# Patient Record
Sex: FEMALE | Race: BLACK OR AFRICAN AMERICAN | NOT HISPANIC OR LATINO | Employment: FULL TIME | URBAN - METROPOLITAN AREA
[De-identification: names, ages, dates, MRNs, and addresses within clinical notes are randomized per-mention and may not be internally consistent; named-entity substitution may affect disease eponyms.]

---

## 2021-10-28 ENCOUNTER — PATIENT ROUNDING (BHMG ONLY) (OUTPATIENT)
Dept: INTERNAL MEDICINE | Facility: CLINIC | Age: 31
End: 2021-10-28

## 2022-07-14 ENCOUNTER — TELEPHONE (OUTPATIENT)
Dept: URGENT CARE | Facility: CLINIC | Age: 32
End: 2022-07-14

## 2023-09-20 ENCOUNTER — HOSPITAL ENCOUNTER (INPATIENT)
Facility: HOSPITAL | Age: 33
LOS: 2 days | Discharge: HOME | DRG: 638 | End: 2023-09-22
Attending: EMERGENCY MEDICINE | Admitting: FAMILY MEDICINE
Payer: COMMERCIAL

## 2023-09-20 DIAGNOSIS — Z78.9 DECREASED ACTIVITIES OF DAILY LIVING (ADL): ICD-10-CM

## 2023-09-20 DIAGNOSIS — E11.65 SEVERE HYPERGLYCEMIA DUE TO DIABETES MELLITUS: Primary | ICD-10-CM

## 2023-09-20 DIAGNOSIS — E11.9 NEW ONSET TYPE 2 DIABETES MELLITUS: ICD-10-CM

## 2023-09-20 DIAGNOSIS — R26.2 DIFFICULTY WALKING: ICD-10-CM

## 2023-09-20 PROBLEM — E11.10 DKA (DIABETIC KETOACIDOSIS): Status: ACTIVE | Noted: 2023-09-20

## 2023-09-20 LAB
ACETONE BLD QL: NEGATIVE
ALBUMIN SERPL-MCNC: 3.5 G/DL (ref 3.5–5.2)
ALBUMIN/GLOB SERPL: 0.8 G/DL
ALP SERPL-CCNC: 187 U/L (ref 39–117)
ALT SERPL W P-5'-P-CCNC: 43 U/L (ref 1–33)
AMPHET+METHAMPHET UR QL: NEGATIVE
ANION GAP SERPL CALCULATED.3IONS-SCNC: 11.6 MMOL/L (ref 5–15)
ANION GAP SERPL CALCULATED.3IONS-SCNC: 14.4 MMOL/L (ref 5–15)
ANION GAP SERPL CALCULATED.3IONS-SCNC: 8.6 MMOL/L (ref 5–15)
AST SERPL-CCNC: 48 U/L (ref 1–32)
BACTERIA UR QL AUTO: ABNORMAL /HPF
BARBITURATES UR QL SCN: NEGATIVE
BASOPHILS # BLD AUTO: 0.03 10*3/MM3 (ref 0–0.2)
BASOPHILS NFR BLD AUTO: 0.4 % (ref 0–1.5)
BENZODIAZ UR QL SCN: NEGATIVE
BILIRUB SERPL-MCNC: 0.3 MG/DL (ref 0–1.2)
BILIRUB UR QL STRIP: NEGATIVE
BUN SERPL-MCNC: 12 MG/DL (ref 6–20)
BUN SERPL-MCNC: 15 MG/DL (ref 6–20)
BUN SERPL-MCNC: 15 MG/DL (ref 6–20)
BUN/CREAT SERPL: 15.2 (ref 7–25)
BUN/CREAT SERPL: 18.2 (ref 7–25)
BUN/CREAT SERPL: 20.3 (ref 7–25)
CALCIUM SPEC-SCNC: 8.6 MG/DL (ref 8.6–10.5)
CALCIUM SPEC-SCNC: 8.8 MG/DL (ref 8.6–10.5)
CALCIUM SPEC-SCNC: 9.3 MG/DL (ref 8.6–10.5)
CANNABINOIDS SERPL QL: NEGATIVE
CHLORIDE SERPL-SCNC: 100 MMOL/L (ref 98–107)
CHLORIDE SERPL-SCNC: 83 MMOL/L (ref 98–107)
CHLORIDE SERPL-SCNC: 97 MMOL/L (ref 98–107)
CLARITY UR: CLEAR
CO2 SERPL-SCNC: 23.4 MMOL/L (ref 22–29)
CO2 SERPL-SCNC: 25.6 MMOL/L (ref 22–29)
CO2 SERPL-SCNC: 26.4 MMOL/L (ref 22–29)
COCAINE UR QL: POSITIVE
COLOR UR: YELLOW
CREAT SERPL-MCNC: 0.66 MG/DL (ref 0.57–1)
CREAT SERPL-MCNC: 0.74 MG/DL (ref 0.57–1)
CREAT SERPL-MCNC: 0.99 MG/DL (ref 0.57–1)
DEPRECATED RDW RBC AUTO: 42 FL (ref 37–54)
EGFRCR SERPLBLD CKD-EPI 2021: 109 ML/MIN/1.73
EGFRCR SERPLBLD CKD-EPI 2021: 118.2 ML/MIN/1.73
EGFRCR SERPLBLD CKD-EPI 2021: 76.9 ML/MIN/1.73
EOSINOPHIL # BLD AUTO: 0.05 10*3/MM3 (ref 0–0.4)
EOSINOPHIL NFR BLD AUTO: 0.7 % (ref 0.3–6.2)
ERYTHROCYTE [DISTWIDTH] IN BLOOD BY AUTOMATED COUNT: 14.6 % (ref 12.3–15.4)
FENTANYL UR-MCNC: NEGATIVE NG/ML
FERRITIN SERPL-MCNC: 119.8 NG/ML (ref 13–150)
FOLATE SERPL-MCNC: 19.8 NG/ML (ref 4.78–24.2)
GLOBULIN UR ELPH-MCNC: 4.2 GM/DL
GLUCOSE BLDC GLUCOMTR-MCNC: 106 MG/DL (ref 70–99)
GLUCOSE BLDC GLUCOMTR-MCNC: 130 MG/DL (ref 70–99)
GLUCOSE BLDC GLUCOMTR-MCNC: 145 MG/DL (ref 70–99)
GLUCOSE BLDC GLUCOMTR-MCNC: 154 MG/DL (ref 70–99)
GLUCOSE BLDC GLUCOMTR-MCNC: 306 MG/DL (ref 70–99)
GLUCOSE BLDC GLUCOMTR-MCNC: 313 MG/DL (ref 70–99)
GLUCOSE BLDC GLUCOMTR-MCNC: 348 MG/DL (ref 70–99)
GLUCOSE BLDC GLUCOMTR-MCNC: 385 MG/DL (ref 70–99)
GLUCOSE BLDC GLUCOMTR-MCNC: 433 MG/DL (ref 70–99)
GLUCOSE BLDC GLUCOMTR-MCNC: 459 MG/DL (ref 70–99)
GLUCOSE BLDC GLUCOMTR-MCNC: 558 MG/DL (ref 70–99)
GLUCOSE BLDC GLUCOMTR-MCNC: 90 MG/DL (ref 70–99)
GLUCOSE BLDC GLUCOMTR-MCNC: >600 MG/DL (ref 70–99)
GLUCOSE BLDC GLUCOMTR-MCNC: >600 MG/DL (ref 70–99)
GLUCOSE SERPL-MCNC: 235 MG/DL (ref 65–99)
GLUCOSE SERPL-MCNC: 458 MG/DL (ref 65–99)
GLUCOSE SERPL-MCNC: 850 MG/DL (ref 65–99)
GLUCOSE UR STRIP-MCNC: ABNORMAL MG/DL
HBA1C MFR BLD: 9.2 % (ref 4.8–5.6)
HCT VFR BLD AUTO: 36.3 % (ref 34–46.6)
HGB BLD-MCNC: 11.8 G/DL (ref 12–15.9)
HGB UR QL STRIP.AUTO: ABNORMAL
HOLD SPECIMEN: NORMAL
HOLD SPECIMEN: NORMAL
HYALINE CASTS UR QL AUTO: ABNORMAL /LPF
IMM GRANULOCYTES # BLD AUTO: 0.01 10*3/MM3 (ref 0–0.05)
IMM GRANULOCYTES NFR BLD AUTO: 0.1 % (ref 0–0.5)
IRON 24H UR-MRATE: 47 MCG/DL (ref 37–145)
IRON SATN MFR SERPL: 10 % (ref 20–50)
KETONES UR QL STRIP: NEGATIVE
LEUKOCYTE ESTERASE UR QL STRIP.AUTO: NEGATIVE
LYMPHOCYTES # BLD AUTO: 2.05 10*3/MM3 (ref 0.7–3.1)
LYMPHOCYTES NFR BLD AUTO: 27.5 % (ref 19.6–45.3)
MAGNESIUM SERPL-MCNC: 1.7 MG/DL (ref 1.6–2.6)
MAGNESIUM SERPL-MCNC: 1.7 MG/DL (ref 1.6–2.6)
MAGNESIUM SERPL-MCNC: 1.8 MG/DL (ref 1.6–2.6)
MCH RBC QN AUTO: 26 PG (ref 26.6–33)
MCHC RBC AUTO-ENTMCNC: 32.5 G/DL (ref 31.5–35.7)
MCV RBC AUTO: 80 FL (ref 79–97)
METHADONE UR QL SCN: NEGATIVE
MONOCYTES # BLD AUTO: 0.55 10*3/MM3 (ref 0.1–0.9)
MONOCYTES NFR BLD AUTO: 7.4 % (ref 5–12)
NEUTROPHILS NFR BLD AUTO: 4.76 10*3/MM3 (ref 1.7–7)
NEUTROPHILS NFR BLD AUTO: 63.9 % (ref 42.7–76)
NITRITE UR QL STRIP: NEGATIVE
NRBC BLD AUTO-RTO: 0 /100 WBC (ref 0–0.2)
OPIATES UR QL: NEGATIVE
OSMOLALITY SERPL: 318 MOSM/KG (ref 275–300)
OXYCODONE UR QL SCN: NEGATIVE
PH UR STRIP.AUTO: 5.5 [PH] (ref 5–8)
PHOSPHATE SERPL-MCNC: 2.8 MG/DL (ref 2.5–4.5)
PHOSPHATE SERPL-MCNC: 3.5 MG/DL (ref 2.5–4.5)
PHOSPHATE SERPL-MCNC: 4.9 MG/DL (ref 2.5–4.5)
PLATELET # BLD AUTO: 206 10*3/MM3 (ref 140–450)
PMV BLD AUTO: 12.2 FL (ref 6–12)
POTASSIUM SERPL-SCNC: 3.4 MMOL/L (ref 3.5–5.2)
POTASSIUM SERPL-SCNC: 4.1 MMOL/L (ref 3.5–5.2)
POTASSIUM SERPL-SCNC: 4.8 MMOL/L (ref 3.5–5.2)
PROT SERPL-MCNC: 7.7 G/DL (ref 6–8.5)
PROT UR QL STRIP: NEGATIVE
RBC # BLD AUTO: 4.54 10*6/MM3 (ref 3.77–5.28)
RBC # UR STRIP: ABNORMAL /HPF
REF LAB TEST METHOD: ABNORMAL
RETICS # AUTO: 0.07 10*6/MM3 (ref 0.02–0.13)
RETICS/RBC NFR AUTO: 1.61 % (ref 0.7–1.9)
SODIUM SERPL-SCNC: 123 MMOL/L (ref 136–145)
SODIUM SERPL-SCNC: 132 MMOL/L (ref 136–145)
SODIUM SERPL-SCNC: 135 MMOL/L (ref 136–145)
SP GR UR STRIP: >1.03 (ref 1–1.03)
SQUAMOUS #/AREA URNS HPF: ABNORMAL /HPF
TIBC SERPL-MCNC: 468 MCG/DL (ref 298–536)
TRANSFERRIN SERPL-MCNC: 314 MG/DL (ref 200–360)
UROBILINOGEN UR QL STRIP: ABNORMAL
VIT B12 BLD-MCNC: 1041 PG/ML (ref 211–946)
WBC # UR STRIP: ABNORMAL /HPF
WBC NRBC COR # BLD: 7.45 10*3/MM3 (ref 3.4–10.8)
WHOLE BLOOD HOLD COAG: NORMAL
WHOLE BLOOD HOLD SPECIMEN: NORMAL

## 2023-09-20 PROCEDURE — 80307 DRUG TEST PRSMV CHEM ANLYZR: CPT | Performed by: EMERGENCY MEDICINE

## 2023-09-20 PROCEDURE — 82746 ASSAY OF FOLIC ACID SERUM: CPT | Performed by: HOSPITALIST

## 2023-09-20 PROCEDURE — 82607 VITAMIN B-12: CPT | Performed by: HOSPITALIST

## 2023-09-20 PROCEDURE — 0 POTASSIUM CHLORIDE PER 2 MEQ: Performed by: EMERGENCY MEDICINE

## 2023-09-20 PROCEDURE — 83036 HEMOGLOBIN GLYCOSYLATED A1C: CPT | Performed by: EMERGENCY MEDICINE

## 2023-09-20 PROCEDURE — 81001 URINALYSIS AUTO W/SCOPE: CPT | Performed by: EMERGENCY MEDICINE

## 2023-09-20 PROCEDURE — 82948 REAGENT STRIP/BLOOD GLUCOSE: CPT

## 2023-09-20 PROCEDURE — 99291 CRITICAL CARE FIRST HOUR: CPT | Performed by: STUDENT IN AN ORGANIZED HEALTH CARE EDUCATION/TRAINING PROGRAM

## 2023-09-20 PROCEDURE — 83540 ASSAY OF IRON: CPT | Performed by: HOSPITALIST

## 2023-09-20 PROCEDURE — 36415 COLL VENOUS BLD VENIPUNCTURE: CPT | Performed by: EMERGENCY MEDICINE

## 2023-09-20 PROCEDURE — 99285 EMERGENCY DEPT VISIT HI MDM: CPT

## 2023-09-20 PROCEDURE — 85025 COMPLETE CBC W/AUTO DIFF WBC: CPT | Performed by: EMERGENCY MEDICINE

## 2023-09-20 PROCEDURE — 83930 ASSAY OF BLOOD OSMOLALITY: CPT | Performed by: EMERGENCY MEDICINE

## 2023-09-20 PROCEDURE — 82009 KETONE BODYS QUAL: CPT | Performed by: EMERGENCY MEDICINE

## 2023-09-20 PROCEDURE — 85045 AUTOMATED RETICULOCYTE COUNT: CPT | Performed by: HOSPITALIST

## 2023-09-20 PROCEDURE — 82728 ASSAY OF FERRITIN: CPT | Performed by: HOSPITALIST

## 2023-09-20 PROCEDURE — 84100 ASSAY OF PHOSPHORUS: CPT | Performed by: EMERGENCY MEDICINE

## 2023-09-20 PROCEDURE — 84466 ASSAY OF TRANSFERRIN: CPT | Performed by: HOSPITALIST

## 2023-09-20 PROCEDURE — 25010000002 ENOXAPARIN PER 10 MG: Performed by: HOSPITALIST

## 2023-09-20 PROCEDURE — 80053 COMPREHEN METABOLIC PANEL: CPT | Performed by: EMERGENCY MEDICINE

## 2023-09-20 PROCEDURE — 83735 ASSAY OF MAGNESIUM: CPT | Performed by: EMERGENCY MEDICINE

## 2023-09-20 PROCEDURE — 36415 COLL VENOUS BLD VENIPUNCTURE: CPT

## 2023-09-20 PROCEDURE — 63710000001 INSULIN REGULAR HUMAN PER 5 UNITS: Performed by: EMERGENCY MEDICINE

## 2023-09-20 RX ORDER — POTASSIUM CHLORIDE 20 MEQ/1
40 TABLET, EXTENDED RELEASE ORAL EVERY 4 HOURS
Status: COMPLETED | OUTPATIENT
Start: 2023-09-20 | End: 2023-09-21

## 2023-09-20 RX ORDER — VILAZODONE HYDROCHLORIDE 20 MG/1
40 TABLET ORAL DAILY
Status: DISCONTINUED | OUTPATIENT
Start: 2023-09-20 | End: 2023-09-22 | Stop reason: HOSPADM

## 2023-09-20 RX ORDER — DEXTROSE AND SODIUM CHLORIDE 5; .9 G/100ML; G/100ML
150 INJECTION, SOLUTION INTRAVENOUS CONTINUOUS PRN
Status: DISCONTINUED | OUTPATIENT
Start: 2023-09-20 | End: 2023-09-21

## 2023-09-20 RX ORDER — BUPRENORPHINE AND NALOXONE 8; 2 MG/1; MG/1
2 FILM, SOLUBLE BUCCAL; SUBLINGUAL DAILY
COMMUNITY
Start: 2023-09-06

## 2023-09-20 RX ORDER — SODIUM CHLORIDE AND POTASSIUM CHLORIDE 150; 450 MG/100ML; MG/100ML
250 INJECTION, SOLUTION INTRAVENOUS CONTINUOUS PRN
Status: DISCONTINUED | OUTPATIENT
Start: 2023-09-20 | End: 2023-09-21

## 2023-09-20 RX ORDER — BISACODYL 10 MG
10 SUPPOSITORY, RECTAL RECTAL DAILY PRN
Status: DISCONTINUED | OUTPATIENT
Start: 2023-09-20 | End: 2023-09-22 | Stop reason: HOSPADM

## 2023-09-20 RX ORDER — SODIUM CHLORIDE 9 MG/ML
250 INJECTION, SOLUTION INTRAVENOUS CONTINUOUS PRN
Status: DISCONTINUED | OUTPATIENT
Start: 2023-09-20 | End: 2023-09-22 | Stop reason: HOSPADM

## 2023-09-20 RX ORDER — DEXTROAMPHETAMINE SACCHARATE, AMPHETAMINE ASPARTATE, DEXTROAMPHETAMINE SULFATE AND AMPHETAMINE SULFATE 7.5; 7.5; 7.5; 7.5 MG/1; MG/1; MG/1; MG/1
30 TABLET ORAL 2 TIMES DAILY
COMMUNITY
Start: 2023-09-11

## 2023-09-20 RX ORDER — DEXTROSE MONOHYDRATE, SODIUM CHLORIDE, AND POTASSIUM CHLORIDE 50; 1.49; 9 G/1000ML; G/1000ML; G/1000ML
150 INJECTION, SOLUTION INTRAVENOUS CONTINUOUS PRN
Status: DISCONTINUED | OUTPATIENT
Start: 2023-09-20 | End: 2023-09-21

## 2023-09-20 RX ORDER — IBUPROFEN 600 MG/1
1 TABLET ORAL
Status: DISCONTINUED | OUTPATIENT
Start: 2023-09-20 | End: 2023-09-21

## 2023-09-20 RX ORDER — AMOXICILLIN 250 MG
2 CAPSULE ORAL 2 TIMES DAILY
Status: DISCONTINUED | OUTPATIENT
Start: 2023-09-20 | End: 2023-09-22 | Stop reason: HOSPADM

## 2023-09-20 RX ORDER — SODIUM CHLORIDE 0.9 % (FLUSH) 0.9 %
10 SYRINGE (ML) INJECTION AS NEEDED
Status: DISCONTINUED | OUTPATIENT
Start: 2023-09-20 | End: 2023-09-22 | Stop reason: HOSPADM

## 2023-09-20 RX ORDER — ENOXAPARIN SODIUM 100 MG/ML
40 INJECTION SUBCUTANEOUS DAILY
Status: DISCONTINUED | OUTPATIENT
Start: 2023-09-20 | End: 2023-09-22 | Stop reason: HOSPADM

## 2023-09-20 RX ORDER — SODIUM CHLORIDE AND POTASSIUM CHLORIDE 150; 900 MG/100ML; MG/100ML
250 INJECTION, SOLUTION INTRAVENOUS CONTINUOUS PRN
Status: DISCONTINUED | OUTPATIENT
Start: 2023-09-20 | End: 2023-09-21

## 2023-09-20 RX ORDER — CHOLECALCIFEROL (VITAMIN D3) 125 MCG
5 CAPSULE ORAL NIGHTLY PRN
Status: DISCONTINUED | OUTPATIENT
Start: 2023-09-20 | End: 2023-09-22 | Stop reason: HOSPADM

## 2023-09-20 RX ORDER — DEXTROSE MONOHYDRATE 25 G/50ML
10-50 INJECTION, SOLUTION INTRAVENOUS
Status: DISCONTINUED | OUTPATIENT
Start: 2023-09-20 | End: 2023-09-22 | Stop reason: HOSPADM

## 2023-09-20 RX ORDER — ONDANSETRON 4 MG/1
4 TABLET, FILM COATED ORAL EVERY 6 HOURS PRN
Status: DISCONTINUED | OUTPATIENT
Start: 2023-09-20 | End: 2023-09-22 | Stop reason: HOSPADM

## 2023-09-20 RX ORDER — ACETAMINOPHEN 500 MG
1000 TABLET ORAL 3 TIMES DAILY
Status: COMPLETED | OUTPATIENT
Start: 2023-09-20 | End: 2023-09-22

## 2023-09-20 RX ORDER — BUPRENORPHINE HYDROCHLORIDE AND NALOXONE HYDROCHLORIDE DIHYDRATE 8; 2 MG/1; MG/1
1 TABLET SUBLINGUAL DAILY
Status: CANCELLED | OUTPATIENT
Start: 2023-09-20

## 2023-09-20 RX ORDER — DEXTROSE MONOHYDRATE, SODIUM CHLORIDE, AND POTASSIUM CHLORIDE 50; 1.49; 4.5 G/1000ML; G/1000ML; G/1000ML
150 INJECTION, SOLUTION INTRAVENOUS CONTINUOUS PRN
Status: DISCONTINUED | OUTPATIENT
Start: 2023-09-20 | End: 2023-09-21

## 2023-09-20 RX ORDER — ARIPIPRAZOLE 10 MG/1
10 TABLET ORAL DAILY
Status: DISCONTINUED | OUTPATIENT
Start: 2023-09-20 | End: 2023-09-22 | Stop reason: HOSPADM

## 2023-09-20 RX ORDER — SODIUM CHLORIDE 0.9 % (FLUSH) 0.9 %
10 SYRINGE (ML) INJECTION EVERY 12 HOURS SCHEDULED
Status: DISCONTINUED | OUTPATIENT
Start: 2023-09-20 | End: 2023-09-22 | Stop reason: HOSPADM

## 2023-09-20 RX ORDER — SODIUM CHLORIDE AND POTASSIUM CHLORIDE 300; 900 MG/100ML; MG/100ML
250 INJECTION, SOLUTION INTRAVENOUS CONTINUOUS PRN
Status: DISCONTINUED | OUTPATIENT
Start: 2023-09-20 | End: 2023-09-21

## 2023-09-20 RX ORDER — TERAZOSIN 1 MG/1
1 CAPSULE ORAL NIGHTLY
Status: DISCONTINUED | OUTPATIENT
Start: 2023-09-20 | End: 2023-09-22 | Stop reason: HOSPADM

## 2023-09-20 RX ORDER — DEXTROSE MONOHYDRATE, SODIUM CHLORIDE, AND POTASSIUM CHLORIDE 50; 2.98; 4.5 G/1000ML; G/1000ML; G/1000ML
150 INJECTION, SOLUTION INTRAVENOUS CONTINUOUS PRN
Status: DISCONTINUED | OUTPATIENT
Start: 2023-09-20 | End: 2023-09-21

## 2023-09-20 RX ORDER — VILAZODONE HYDROCHLORIDE 40 MG/1
1 TABLET ORAL DAILY
COMMUNITY
Start: 2023-08-31

## 2023-09-20 RX ORDER — SODIUM CHLORIDE 9 MG/ML
40 INJECTION, SOLUTION INTRAVENOUS AS NEEDED
Status: DISCONTINUED | OUTPATIENT
Start: 2023-09-20 | End: 2023-09-22 | Stop reason: HOSPADM

## 2023-09-20 RX ORDER — ONDANSETRON 2 MG/ML
4 INJECTION INTRAMUSCULAR; INTRAVENOUS EVERY 6 HOURS PRN
Status: DISCONTINUED | OUTPATIENT
Start: 2023-09-20 | End: 2023-09-22 | Stop reason: HOSPADM

## 2023-09-20 RX ORDER — BISACODYL 5 MG/1
5 TABLET, DELAYED RELEASE ORAL DAILY PRN
Status: DISCONTINUED | OUTPATIENT
Start: 2023-09-20 | End: 2023-09-22 | Stop reason: HOSPADM

## 2023-09-20 RX ORDER — SODIUM CHLORIDE 450 MG/100ML
250 INJECTION, SOLUTION INTRAVENOUS CONTINUOUS PRN
Status: DISCONTINUED | OUTPATIENT
Start: 2023-09-20 | End: 2023-09-21

## 2023-09-20 RX ORDER — DEXTROSE MONOHYDRATE, SODIUM CHLORIDE, AND POTASSIUM CHLORIDE 50; 2.98; 9 G/1000ML; G/1000ML; G/1000ML
150 INJECTION, SOLUTION INTRAVENOUS CONTINUOUS PRN
Status: DISCONTINUED | OUTPATIENT
Start: 2023-09-20 | End: 2023-09-21

## 2023-09-20 RX ORDER — NICOTINE POLACRILEX 4 MG
15 LOZENGE BUCCAL
Status: DISCONTINUED | OUTPATIENT
Start: 2023-09-20 | End: 2023-09-21

## 2023-09-20 RX ORDER — DEXTROSE AND SODIUM CHLORIDE 5; .45 G/100ML; G/100ML
150 INJECTION, SOLUTION INTRAVENOUS CONTINUOUS PRN
Status: DISCONTINUED | OUTPATIENT
Start: 2023-09-20 | End: 2023-09-21

## 2023-09-20 RX ORDER — POLYETHYLENE GLYCOL 3350 17 G/17G
17 POWDER, FOR SOLUTION ORAL DAILY PRN
Status: DISCONTINUED | OUTPATIENT
Start: 2023-09-20 | End: 2023-09-22 | Stop reason: HOSPADM

## 2023-09-20 RX ADMIN — ACETAMINOPHEN 1000 MG: 500 TABLET ORAL at 16:18

## 2023-09-20 RX ADMIN — Medication 10 ML: at 13:46

## 2023-09-20 RX ADMIN — ARIPIPRAZOLE 10 MG: 10 TABLET ORAL at 16:18

## 2023-09-20 RX ADMIN — INSULIN HUMAN 10 UNITS: 100 INJECTION, SOLUTION PARENTERAL at 11:30

## 2023-09-20 RX ADMIN — SODIUM CHLORIDE 1000 ML: 9 INJECTION, SOLUTION INTRAVENOUS at 11:06

## 2023-09-20 RX ADMIN — SODIUM CHLORIDE 1000 ML/HR: 9 INJECTION, SOLUTION INTRAVENOUS at 13:45

## 2023-09-20 RX ADMIN — INSULIN HUMAN 8 UNITS/HR: 1 INJECTION, SOLUTION INTRAVENOUS at 13:50

## 2023-09-20 RX ADMIN — TERAZOSIN HYDROCHLORIDE 1 MG: 1 CAPSULE ORAL at 20:46

## 2023-09-20 RX ADMIN — POTASSIUM CHLORIDE 40 MEQ: 1500 TABLET, EXTENDED RELEASE ORAL at 20:45

## 2023-09-20 RX ADMIN — Medication 10 ML: at 20:38

## 2023-09-20 RX ADMIN — VILAZODONE HYDROCHLORIDE 40 MG: 20 TABLET ORAL at 16:17

## 2023-09-20 RX ADMIN — POTASSIUM CHLORIDE AND SODIUM CHLORIDE 250 ML/HR: 450; 150 INJECTION, SOLUTION INTRAVENOUS at 16:18

## 2023-09-20 RX ADMIN — ACETAMINOPHEN 1000 MG: 500 TABLET ORAL at 20:38

## 2023-09-20 RX ADMIN — ENOXAPARIN SODIUM 40 MG: 100 INJECTION SUBCUTANEOUS at 16:18

## 2023-09-20 RX ADMIN — POTASSIUM CHLORIDE, DEXTROSE MONOHYDRATE AND SODIUM CHLORIDE 150 ML/HR: 150; 5; 450 INJECTION, SOLUTION INTRAVENOUS at 20:31

## 2023-09-20 RX ADMIN — INSULIN HUMAN 5.7 UNITS/HR: 1 INJECTION, SOLUTION INTRAVENOUS at 20:34

## 2023-09-20 NOTE — PAYOR COMM NOTE
"PATIENT INFORMATION  Name:  Jose Chacko  MRN#:     [id number]  :  1990         ADMISSION INFORMATION  CLASS: Inpatient   DOS:  23        CURRENT ATTENDING PROVIDER INFORMATION  Name/NPI: Suyapa Riddle DO [[id number]]  Phone: Phone: [phone]        RENDERING FACILITY  Name:  [facility]   NPI:  [id number]  TID:  [id number]  Address:      [address] [place] [address]  Phone  [phone]        CASE MANAGEMENT CONTACT INFORMATION  Phone:      [phone]  Fax:           [phone]        ADMISSION DIAGNOSIS  DKA (diabetic ketoacidosis) [E11.10]  ++NO Previous Diagnosis of Diabetes++            Jose Chacko (33 y.o. Female)       Date of Birth   1990    Social Security Number       Address   [address] [place], [place] [address]    Home Phone   [phone]    MRN   [id number]       [employer]   None    Marital Status   Single                            Admission Date   23    Admission Type   Emergency    Admitting Provider   Suyapa Riddle DO    Attending Provider   Suyapa Riddle DO    Department, Room/Bed   [facility] EMERGENCY ROOM, BESS/BESS       Discharge Date       Discharge Disposition       Discharge Destination                                 Attending Provider: Suyapa Riddle DO    Allergies: No Known Allergies    Isolation: None   Infection: None   Code Status: CPR    Ht: 170.2 cm (67\")   Wt: 115 kg (253 lb 8.5 oz)    Admission Cmt: None   Principal Problem: DKA (diabetic ketoacidosis) [E11.10]                   Active Insurance as of 2023       Primary Coverage       Payor Plan Insurance Group Employer/Plan Group    [facility] BY ZURI [facility] BY ZURI [id number]       Payor Plan Address Payor Plan Phone Number Payor Plan Fax Number Effective Dates    [address] [address]   2021 - None Entered    [facility] [id number]         Subscriber Name Subscriber Birth Date Member ID       JOSE CHACKO 1990 " [id number]                        History & Physical        Suyapa Riddle DO at 23 1352           [facility]  Date[facility]   Patient Name: Jose Chacko  : 1990  MRN: [id number]  Primary Care Physician:  Provider, No Known  Date of admission: 2023    Subjective hyperglycemia  Subjective     Chief Complaint: Hyperglycemia    HPI: Patient is a 33-year-old female that presents to the emergency room with a 2-week history of increased fatigue, polyuria, polydipsia.  She uses her father's glucometer to measure her blood sugar and found it to be greater than 500.    On arrival to the ED, patient is a temperature of 98.1, pulse of 102, respiratory rate of 16, blood pressure 131/75, and she saturating 97% on room air.  Patient's glucose is 850.  Sodium is 123.  Chloride is 83.  ALT is 43, AST is 48, alkaline phosphatase is 187.    Patient's toxicology screen is positive for cocaine.  Hemoglobin is 11.8.  Personal History     Past Medical History:  Past Medical History:   Diagnosis Date    Anxiety     Depression     anxiety    PTSD (post-traumatic stress disorder)        Past Surgical History:  Past Surgical History:   Procedure Laterality Date     SECTION         Family History:   History reviewed. No pertinent family history.    Social History:   Social History     Socioeconomic History    Marital status: Single   Tobacco Use    Smoking status: Former     Packs/day: 0.50     Types: Cigarettes    Smokeless tobacco: Never   Vaping Use    Vaping Use: Every day    Substances: Nicotine, Flavoring   Substance and Sexual Activity    Alcohol use: Never    Drug use: Never    Sexual activity: Defer       Home Medications:  ARIPiprazole, amphetamine-dextroamphetamine, buprenorphine-naloxone, clonazePAM, diclofenac, gabapentin, prazosin, and vilazodone    Allergies:  No Known Allergies    Review of Systems   All systems were reviewed and negative except for:  fatigue, polyuria, polydipsia    Objective   Objective     Vitals:   Temp:  [98.1 °F (36.7 °C)] 98.1 °F (36.7 °C)  Heart Rate:  [102] 102  Resp:  [16] 16  BP: (131)/(75) 131/75    Physical Exam    Constitutional: Awake, alert, no acute distress   Eyes: Pupils equal, sclerae anicteric, no conjunctival injection   HENT: NCAT, mucous membranes moist   Neck: Supple, no thyromegaly, no lymphadenopathy, trachea midline   Respiratory: Clear to auscultation bilaterally, nonlabored respirations    Cardiovascular: RRR, no murmurs, rubs, or gallops, palpable pedal pulses bilaterally   Gastrointestinal: Positive bowel sounds, soft, nontender, nondistended   Musculoskeletal: No bilateral ankle edema, no clubbing or cyanosis to extremities   Psychiatric: Appropriate affect, cooperative   Neurologic: Oriented x 3, strength symmetric in all extremities, Cranial Nerves grossly intact to confrontation, speech clear   Skin: No rashes     Result Review    Result Review:  I have personally reviewed the results from the time of this admission to 9/20/2023 14:14 EDT and agree with these findings:  [x]  Laboratory  []  Microbiology  [x]  Radiology  []  EKG/Telemetry   []  Cardiology/Vascular   []  Pathology  [x]  Old records  []  Other:      Assessment & Plan   Assessment / Plan   #1 hyperosmolar hyperglycemia   -Insulin drip, aggressive hydration, correct electrolytes  -Follow-up hemoglobin A1c.  Diabetic educator.  -Patient will need admission to the ICU.    #2 anemia  -Work-up ordered    #3 cocaine use  -Patient is on Suboxone at home.  She has relapsed.  I will hold Suboxone.  -Case management/social work consulted    #4 depression and anxiety PTSD  -Continue Abilify, Viibryd.  Hospital does not have prazosin.  We will order Hytrin substitution.  -We will hold home Klonopin for now.  Patient is very fatigued.      #5 ADH  -Patient on Adderall at home.  We do not have Adderall at the hospital.  Patient will need to supply her with  medication.  -Patient's amphetamine screen is negative.    Patient is on multiple prescriptions that adversely interact with each other: Adderall, Klonopin, gabapentin, Suboxone.  Additionally, patient has used cocaine.  Will defer to the primary rounding team/pharmacy to help establish which medications are needed and which medications patient is actually taking.      DVT prophylaxis:  lovenox    CODE STATUS:    Level Of Support Discussed With: Patient  Code Status (Patient has no pulse and is not breathing): CPR (Attempt to Resuscitate)  Medical Interventions (Patient has pulse or is breathing): Full Support      Admission Status:  I believe this patient meets inpatient status.    Electronically signed by Suyapa Riddle DO, 09/20/23, 1:53 PM EDT.             Electronically signed by Suyapa Riddle DO at 09/20/23 1418       Vital Signs (last day)       Date/Time Temp Temp src Pulse Resp BP Patient Position SpO2    09/20/23 1043 98.1 (36.7) Oral -- -- -- -- --    09/20/23 1017 -- -- 102 16 131/75 Sitting 97          Facility-Administered Medications as of 9/20/2023   Medication Dose Route Frequency Provider Last Rate Last Admin    Calcium Replacement - Follow Nurse / BPA Driven Protocol   Does not apply PRDavid Mendez MD        dextrose (D50W) (25 g/50 mL) IV injection 10-50 mL  10-50 mL Intravenous Q15 Min David Colon MD        dextrose (GLUTOSE) oral gel 15 g  15 g Oral Q15 Min PRDavid Mendez MD        dextrose 5 % and sodium chloride 0.45 % infusion  150 mL/hr Intravenous Continuous David Colon MD        dextrose 5 % and sodium chloride 0.45 % with KCl 20 mEq/L infusion  150 mL/hr Intravenous Continuous David Colon MD        dextrose 5 % and sodium chloride 0.45 % with KCl 40 mEq/L infusion  150 mL/hr Intravenous Continuous PRDavid Mendez MD        dextrose 5 % and sodium chloride 0.9 % infusion  150 mL/hr Intravenous Continuous David Colon MD         dextrose 5 % and sodium chloride 0.9 % with KCl 20 mEq/L infusion  150 mL/hr Intravenous Continuous PRN David Ortega MD        dextrose 5 % and sodium chloride 0.9 % with KCl 40 mEq/L infusion  150 mL/hr Intravenous Continuous PRN David Ortega MD        Glucagon (GLUCAGEN) injection 1 mg  1 mg Intramuscular Q15 Min PRN David Ortega MD        [COMPLETED] insulin regular (humuLIN R,novoLIN R) injection 10 Units  10 Units Intravenous Once David Ortega MD   10 Units at 09/20/23 1130    insulin regular 1 unit/mL in 0.9% sodium chloride (Glucommander)  0-100 Units/hr Intravenous Titrated David Ortega MD 8 mL/hr at 09/20/23 1350 8 Units/hr at 09/20/23 1350    Magnesium Standard Dose Replacement - Follow Nurse / BPA Driven Protocol   Does not apply David Colon MD        Phosphorus Replacement - Follow Nurse / BPA Driven Protocol   Does not apply David Colon MD        Potassium Replacement - Follow Nurse / BPA Driven Protocol   Does not apply David Colon MD        sodium chloride 0.45 % 1,000 mL with potassium chloride 40 mEq infusion  250 mL/hr Intravenous Continuous PRN David Ortega MD        sodium chloride 0.45 % infusion  250 mL/hr Intravenous Continuous PRN David Ortega MD        sodium chloride 0.45 % with KCl 20 mEq/L infusion  250 mL/hr Intravenous Continuous PRDavid Mendez MD        [COMPLETED] sodium chloride 0.9 % bolus 1,000 mL  1,000 mL Intravenous Once David Ortega MD 2,000 mL/hr at 09/20/23 1106 1,000 mL at 09/20/23 1106    sodium chloride 0.9 % bolus 1,000 mL  1,000 mL Intravenous Once Suyapa Riddle DO        sodium chloride 0.9 % bolus  1,000 mL/hr Intravenous Continuous David Ortega MD 1,000 mL/hr at 09/20/23 1345 1,000 mL/hr at 09/20/23 1345    sodium chloride 0.9 % flush 10 mL  10 mL Intravenous PRN David Ortega MD        sodium chloride 0.9 % flush 10 mL  10 mL Intravenous Q12H David Ortega MD    10 mL at 09/20/23 1346    sodium chloride 0.9 % flush 10 mL  10 mL Intravenous PRN David Ortega MD        sodium chloride 0.9 % infusion 40 mL  40 mL Intravenous PRN David Ortega MD        sodium chloride 0.9 % infusion  250 mL/hr Intravenous Continuous PRN David Ortega MD        sodium chloride 0.9 % with KCl 20 mEq/L infusion  250 mL/hr Intravenous Continuous PRN David Ortega MD        sodium chloride 0.9 % with KCl 40 mEq/L infusion  250 mL/hr Intravenous Continuous PRN David Ortega MD         Lab Results (last 24 hours)       Procedure Component Value Units Date/Time    Magnesium [[id number]]  (Normal) Collected: 09/20/23 1039    Specimen: Blood Updated: 09/20/23 1328     Magnesium 1.7 mg/dL     Phosphorus [[id number]]  (Abnormal) Collected: 09/20/23 1039    Specimen: Blood Updated: 09/20/23 1328     Phosphorus 4.9 mg/dL     POC Glucose Once [[id number]]  (Abnormal) Collected: 09/20/23 1320    Specimen: Blood Updated: 09/20/23 1323     Glucose 558 mg/dL      Comment: Serial Number: [id number]:  [id number]       Hemoglobin A1c [[id number]] Collected: 09/20/23 1132    Specimen: Blood Updated: 09/20/23 1254    POC Glucose Once [[id number]]  (Abnormal) Collected: 09/20/23 1200    Specimen: Blood Updated: 09/20/23 1206     Glucose >600 mg/dL      Comment: Serial Number: [id number]:  [id number]       Urinalysis With Culture If Indicated - Urine, Clean Catch [[id number]]  (Abnormal) Collected: 09/20/23 1115    Specimen: Urine, Clean Catch Updated: 09/20/23 1202     Color, UA Yellow     Appearance, UA Clear     pH, UA 5.5     Specific Gravity, UA >1.030     Glucose, UA >=1000 mg/dL (3+)     Ketones, UA Negative     Bilirubin, UA Negative     Blood, UA Moderate (2+)     Protein, UA Negative     Leuk Esterase, UA Negative     Nitrite, UA Negative     Urobilinogen, UA 0.2 E.U./dL    Narrative:      In absence of clinical symptoms, the presence of pyuria, bacteria, and/or nitrites on the  urinalysis result does not correlate with infection.    Urinalysis, Microscopic Only - Urine, Clean Catch [[id number]]  (Abnormal) Collected: 09/20/23 1115    Specimen: Urine, Clean Catch Updated: 09/20/23 1202     RBC, UA 0-2 /HPF      WBC, UA 0-2 /HPF      Comment: Urine culture not indicated.        Bacteria, UA None Seen /HPF      Squamous Epithelial Cells, UA 0-2 /HPF      Hyaline Casts, UA None Seen /LPF      Methodology Manual Light Microscopy    Urine Drug Screen - Urine, Clean Catch [[id number]]  (Abnormal) Collected: 09/20/23 1115    Specimen: Urine, Clean Catch Updated: 09/20/23 1157     Amphet/Methamphet, Screen Negative     Barbiturates Screen, Urine Negative     Benzodiazepine Screen, Urine Negative     Cocaine Screen, Urine Positive     Opiate Screen Negative     THC, Screen, Urine Negative     Methadone Screen, Urine Negative     Oxycodone Screen, Urine Negative     Fentanyl, Urine Negative    Narrative:      Negative Thresholds Per Drugs Screened:    Amphetamines                 500 ng/ml  Barbiturates                 200 ng/ml  Benzodiazepines              100 ng/ml  Cocaine                      300 ng/ml  Methadone                    300 ng/ml  Opiates                      300 ng/ml  Oxycodone                    100 ng/ml  THC                           50 ng/ml  Fentanyl                       5 ng/ml      The Normal Value for all drugs tested is negative. This report includes final unconfirmed screening results to be used for medical treatment purposes only. Unconfirmed results must not be used for non-medical purposes such as employment or legal testing. Clinical consideration should be applied to any drug of abuse test, particularly when unconfirmed results are used.            CBC & Differential [[id number]]  (Abnormal) Collected: 09/20/23 1039    Specimen: Blood Updated: 09/20/23 1139    Narrative:      The following orders were created for panel order CBC & Differential.  Procedure                                Abnormality         Status                     ---------                               -----------         ------                     CBC Auto Differential[[id number]]        Abnormal            Final result               Scan Slide[[id number]]                                                                    Please view results for these tests on the individual orders.    CBC Auto Differential [[id number]]  (Abnormal) Collected: 09/20/23 1132    Specimen: Blood Updated: 09/20/23 1139     WBC 7.45 10*3/mm3      RBC 4.54 10*6/mm3      Hemoglobin 11.8 g/dL      Hematocrit 36.3 %      MCV 80.0 fL      MCH 26.0 pg      MCHC 32.5 g/dL      RDW 14.6 %      RDW-SD 42.0 fl      MPV 12.2 fL      Platelets 206 10*3/mm3      Neutrophil % 63.9 %      Lymphocyte % 27.5 %      Monocyte % 7.4 %      Eosinophil % 0.7 %      Basophil % 0.4 %      Immature Grans % 0.1 %      Neutrophils, Absolute 4.76 10*3/mm3      Lymphocytes, Absolute 2.05 10*3/mm3      Monocytes, Absolute 0.55 10*3/mm3      Eosinophils, Absolute 0.05 10*3/mm3      Basophils, Absolute 0.03 10*3/mm3      Immature Grans, Absolute 0.01 10*3/mm3      nRBC 0.0 /100 WBC     Osmolality, Serum [[id number]] Collected: 09/20/23 1132    Specimen: Blood Updated: 09/20/23 1135    Acetone [[id number]]  (Normal) Collected: 09/20/23 1120    Specimen: Blood Updated: 09/20/23 1134     Acetone Negative    Comprehensive Metabolic Panel [[id number]]  (Abnormal) Collected: 09/20/23 1039    Specimen: Blood Updated: 09/20/23 1121     Glucose 850 mg/dL      BUN 15 mg/dL      Creatinine 0.99 mg/dL      Sodium 123 mmol/L      Potassium 4.8 mmol/L      Comment: Slight hemolysis detected by analyzer. Results may be affected.        Chloride 83 mmol/L      CO2 25.6 mmol/L      Calcium 9.3 mg/dL      Total Protein 7.7 g/dL      Albumin 3.5 g/dL      ALT (SGPT) 43 U/L      AST (SGOT) 48 U/L      Comment: Slight hemolysis detected by analyzer. Results may be affected.        Alkaline  Phosphatase 187 U/L      Total Bilirubin 0.3 mg/dL      Globulin 4.2 gm/dL      A/G Ratio 0.8 g/dL      BUN/Creatinine Ratio 15.2     Anion Gap 14.4 mmol/L      eGFR 76.9 mL/min/1.73     Narrative:      GFR Normal >60  Chronic Kidney Disease <60  Kidney Failure <15      Mobile Draw [[id number]] Collected: 09/20/23 1039    Specimen: Blood Updated: 09/20/23 1044    Narrative:      The following orders were created for panel order Mobile Draw.  Procedure                               Abnormality         Status                     ---------                               -----------         ------                     Green Top (Gel)[[id number]]                                  Final result               Lavender Top[[id number]]                                     Final result               Gold Top - SST[[id number]]                                   Final result               Light Blue Top[[id number]]                                   Final result                 Please view results for these tests on the individual orders.    Light Blue Top [[id number]] Collected: 09/20/23 1039    Specimen: Blood Updated: 09/20/23 1044     Extra Tube Hold for add-ons.     Comment: Auto resulted       Gold Top - SST [[id number]] Collected: 09/20/23 1039    Specimen: Blood Updated: 09/20/23 1044     Extra Tube Hold for add-ons.     Comment: Auto resulted.       Green Top (Gel) [[id number]] Collected: 09/20/23 1039    Specimen: Blood Updated: 09/20/23 1044     Extra Tube Hold for add-ons.     Comment: Auto resulted.       Lavender Top [[id number]] Collected: 09/20/23 1039    Specimen: Blood Updated: 09/20/23 1044     Extra Tube hold for add-on     Comment: Auto resulted       POC Glucose Once [[id number]]  (Abnormal) Collected: 09/20/23 1019    Specimen: Blood Updated: 09/20/23 1021     Glucose >600 mg/dL      Comment: Serial Number: [id number]:  [id number]               Orders (active)        Start     Ordered    09/21/23 0600  Daily  Weights  Daily       09/20/23 1250    09/20/23 1600  Basic Metabolic Panel  Every 4 Hours       09/20/23 1250    09/20/23 1600  Magnesium  Every 4 Hours       09/20/23 1250    09/20/23 1600  Phosphorus  Every 4 Hours       09/20/23 1250    09/20/23 1430  sodium chloride 0.9 % bolus 1,000 mL  Once         09/20/23 1410    09/20/23 1317  Code Status and Medical Interventions:  Continuous         09/20/23 1317    09/20/23 1315  sodium chloride 0.9 % flush 10 mL  Every 12 Hours Scheduled         09/20/23 1250    09/20/23 1315  sodium chloride 0.9 % bolus  Continuous         09/20/23 1250    09/20/23 1315  insulin regular 1 unit/mL in 0.9% sodium chloride (Glucommander)  Titrated        Note to Pharmacy: Upon initiation of Glucommander insulin drip, make sure all other insulin orders and anti-diabetic medications have been discontinued.    09/20/23 1250    09/20/23 1307  Inpatient Admission  Once         09/20/23 1307    09/20/23 1300  Vital Signs  Every Hour       09/20/23 1250    09/20/23 1300  Strict Intake & Output  Every Hour      Comments: While on Insulin Infusion    09/20/23 1250    09/20/23 1250  Oxygen Therapy- Nasal Cannula; Titrate 1-6 LPM Per SpO2; 90 - 95%  Continuous         09/20/23 1250    09/20/23 1250  Insert Peripheral IV x2  Once         09/20/23 1250    09/20/23 1250  Saline Lock & Maintain IV Access  Continuous         09/20/23 1250    09/20/23 1250  Corrected Serum Sodium = Measured Sodium + [1.6 x ((Glucose - 100)/100)]  Continuous         09/20/23 1250    09/20/23 1250  Do NOT Discontinue Insulin Infusion Until 2 Hours After First Dose of Basal SQ Insulin  Continuous         09/20/23 1250    09/20/23 1250  Prior to Initiating Glucommander™, Ensure All Prior Insulin Orders Are Discontinued  Once         09/20/23 1250    09/20/23 1250  Do Not Start Insulin Infusion if Potassium < 3.3  Per Order Details         09/20/23 1250    09/20/23 1250  Use a Dedicated Line for Insulin Infusion (If Possible).   May Use a Carrier Fluid of NS at KVO Rate if Insulin Rate is Insufficient to Maintain IV Patency.  Prime IV Line With Insulin Infusion  Continuous         09/20/23 1250    09/20/23 1250  Glucommander Must Be Discontinued if Insulin Infusion is Discontinued.  If Insulin Infusion is Restarted, Previous Glucommander Settings Must Be Discontinued and Re-Entered From New Order  Per Order Details         09/20/23 1250    09/20/23 1250  Once HHS Meets Resolution Criteria - Call Provider for Transition Orders From IV to SQ Insulin  Per Order Details        Comments: RESOLUTION of HHS:   - Normal Serum Osmolality (< 305)   - Patient is Mentally Alert    09/20/23 1250    09/20/23 1250  NPO Diet NPO Type: Strict NPO  Diet Effective Now         09/20/23 1250    09/20/23 1250  Utilize the Start Meal Feature / Meal Bolus Feature in Glucommander if Patient Starts a Diet or Bolus Tube Feedings  Until Discontinued         09/20/23 1250    09/20/23 1250  Notify Provider - Insulin Infusion  Until Discontinued         09/20/23 1250    09/20/23 1250  Inpatient Diabetes Educator Consult  Once        Provider:  (Not yet assigned)    09/20/23 1250    09/20/23 1250  Hemoglobin A1c  STAT         09/20/23 1250    09/20/23 1250  RN to Release PRN POC Glucose Orders Per Glucommander  Continuous        Comments: Glucommander Recommended POC Glucose Testing Will Vary Between Every 15 Minutes & Every 2 Hours   Release PRN POC Glucose Orders as Needed    09/20/23 1250    09/20/23 1250  RN to Order STAT Glucose For Any POC Glucose <10 or >600  Continuous        Comments: Do Not Delay Treatment of Unstable Patient to Obtain Glucose Sample  Inform Provider of Results    09/20/23 1250    09/20/23 1249  Potassium Replacement - Follow Nurse / BPA Driven Protocol  As Needed         09/20/23 1250    09/20/23 1249  Magnesium Standard Dose Replacement - Follow Nurse / BPA Driven Protocol  As Needed         09/20/23 1250    09/20/23 1249  Phosphorus  Replacement - Follow Nurse / BPA Driven Protocol  As Needed         09/20/23 1250    09/20/23 1249  Calcium Replacement - Follow Nurse / BPA Driven Protocol  As Needed         09/20/23 1250    09/20/23 1249  dextrose (GLUTOSE) oral gel 15 g  Every 15 Minutes PRN         09/20/23 1250    09/20/23 1249  dextrose (D50W) (25 g/50 mL) IV injection 10-50 mL  Every 15 Minutes PRN         09/20/23 1250    09/20/23 1249  Glucagon (GLUCAGEN) injection 1 mg  Every 15 Minutes PRN         09/20/23 1250    09/20/23 1249  sodium chloride 0.9 % infusion  Continuous PRN         09/20/23 1250    09/20/23 1249  sodium chloride 0.9 % with KCl 20 mEq/L infusion  Continuous PRN         09/20/23 1250    09/20/23 1249  sodium chloride 0.9 % with KCl 40 mEq/L infusion  Continuous PRN         09/20/23 1250    09/20/23 1249  dextrose 5 % and sodium chloride 0.9 % infusion  Continuous PRN         09/20/23 1250    09/20/23 1249  dextrose 5 % and sodium chloride 0.9 % with KCl 20 mEq/L infusion  Continuous PRN         09/20/23 1250    09/20/23 1249  dextrose 5 % and sodium chloride 0.9 % with KCl 40 mEq/L infusion  Continuous PRN         09/20/23 1250    09/20/23 1249  sodium chloride 0.45 % infusion  Continuous PRN         09/20/23 1250    09/20/23 1249  sodium chloride 0.45 % with KCl 20 mEq/L infusion  Continuous PRN         09/20/23 1250    09/20/23 1249  sodium chloride 0.45 % 1,000 mL with potassium chloride 40 mEq infusion  Continuous PRN         09/20/23 1250    09/20/23 1249  dextrose 5 % and sodium chloride 0.45 % infusion  Continuous PRN         09/20/23 1250    09/20/23 1249  dextrose 5 % and sodium chloride 0.45 % with KCl 20 mEq/L infusion  Continuous PRN         09/20/23 1250    09/20/23 1249  dextrose 5 % and sodium chloride 0.45 % with KCl 40 mEq/L infusion  Continuous PRN         09/20/23 1250    09/20/23 1249  sodium chloride 0.9 % flush 10 mL  As Needed         09/20/23 1250    09/20/23 1249  sodium chloride 0.9 % infusion 40  mL  As Needed         09/20/23 1250    09/20/23 1219  Hospitalist (on-call MD unless specified)  Once        Specialty:  Hospitalist  Provider:  Suyapa Riddle DO    09/20/23 1218    09/20/23 1100  POC Glucose Q1H  Every Hour      Comments: Complete no more than 45 minutes prior to patient eating      09/20/23 1020    09/20/23 1036  Osmolality, Serum  Once         09/20/23 1035    09/20/23 1021  Cardiac Monitoring  Continuous        Comments: Follow Standing Orders As Outlined in Process Instructions (Open Order Report to View Full Instructions)    09/20/23 1020    09/20/23 1021  Insert Peripheral IV  Once         09/20/23 1020    09/20/23 1020  sodium chloride 0.9 % flush 10 mL  As Needed         09/20/23 1020    09/11/23 0000  amphetamine-dextroamphetamine (ADDERALL) 30 MG tablet  2 Times Daily         09/20/23 1312    09/06/23 0000  buprenorphine-naloxone (SUBOXONE) 8-2 MG film film  Daily         09/20/23 1312    08/31/23 0000  vilazodone (VIIBRYD) 40 MG tablet tablet  Daily         09/20/23 1312    Unscheduled  POC Glucose PRN  As Needed      Comments: Glu[facility]ander Recommended POC Glucose Testing Will Vary Between Every 15 Minutes & Every 2 Hours Release PRN POC Glucose Orders as Needed      09/20/23 1250    Unscheduled  Treat Hypoglycemia As Recommended By Glu[facility]ander™ & Notify Provider of Treatment  As Needed      Comments: Follow Hypoglycemia Orders As Outlined in Process Instructions (Open Order Report to View Full Instructions)  Notify Provider Any Time Hypoglycemia Treatment is Administered    09/20/23 1250    Unscheduled  If Insulin Infusion is Paused - Follow Glu[facility]ander Instructions  As Needed       09/20/23 1250    Signed and Held  Vital Signs  Every 4 Hours       Signed and Held    Signed and Held  Intake & Output  Every Shift       Signed and Held    Signed and Held  Weigh Patient  Once         Signed and Held    Signed and Held  Oral Care  2 Times Daily       Signed and Held    Signed and Held   Insert Peripheral IV  Once         Signed and Held    Signed and Held  Saline Lock & Maintain IV Access  Continuous         Signed and Held    Signed and Held  sodium chloride 0.9 % flush 10 mL  Every 12 Hours Scheduled         Signed and Held    Signed and Held  sodium chloride 0.9 % flush 10 mL  As Needed         Signed and Held    Signed and Held  sodium chloride 0.9 % infusion 40 mL  As Needed         Signed and Held    Signed and Held  sennosides-docusate (PERICOLACE) 8.6-50 MG per tablet 2 tablet  2 Times Daily        See Hyperspace for full Linked Orders Report.    Signed and Held    Signed and Held  polyethylene glycol (MIRALAX) packet 17 g  Daily PRN        See Hyperspace for full Linked Orders Report.    Signed and Held    Signed and Held  bisacodyl (DULCOLAX) EC tablet 5 mg  Daily PRN        See Hyperspace for full Linked Orders Report.    Signed and Held    Signed and Held  bisacodyl (DULCOLAX) suppository 10 mg  Daily PRN        See Hyperspace for full Linked Orders Report.    Signed and Held    Signed and Held  Enoxaparin Sodium (LOVENOX) syringe 40 mg  Daily         Signed and Held    Signed and Held  Diet: Liquid Diets; Clear Liquid; Fluid Consistency: Thin (IDDSI 0)  Diet Effective Now         Signed and Held    Signed and Held  OT Consult: Eval & Treat  Once         Signed and Held    Signed and Held  PT Consult: Eval & Treat As Tolerated; Discharge Placement Assessment  Once         Signed and Held    Signed and Held  Inpatient Case Management  Consult  Once        Provider:  (Not yet assigned)    Signed and Held    Signed and Held  acetaminophen (TYLENOL) tablet 1,000 mg  3 Times Daily         Signed and Held    Signed and Held  melatonin tablet 5 mg  Nightly PRN         Signed and Held    Signed and Held  ondansetron (ZOFRAN) tablet 4 mg  Every 6 Hours PRN        See Hyperspace for full Linked Orders Report.    Signed and Held    Signed and Held  ondansetron (ZOFRAN) injection 4  mg  Every 6 Hours PRN        See Hyperspace for full Linked Orders Report.    Signed and Held    Signed and Held  ARIPiprazole (ABILIFY) tablet 10 mg  Daily         Signed and Held    Signed and Held  terazosin (HYTRIN) capsule 1 mg  Nightly         Signed and Held    Signed and Held  vilazodone (VIIBRYD) tablet 40 mg  Daily         Signed and Held    Signed and Held  Folate  Once         Signed and Held    Signed and Held  Vitamin B12  Once         Signed and Held    Signed and Held  Ferritin  Once         Signed and Held    Signed and Held  Iron Profile  Once         Signed and Held    Signed and Held  Occult Blood, Fecal By Immunoassay - Stool, Per Rectum  Once         Signed and Held    Signed and Held  Reticulocytes  Once         Signed and Held

## 2023-09-20 NOTE — PLAN OF CARE
Goal Outcome Evaluation:      Pt arrived to ICU from ED at approx 1400. VSS. Pt is very concerned about diet--currently on clear liquids only. Pt will need extensive diabetes education and any helpful resources/materials; educator not available at this time, will contact tomorrow.  Pt  is also currently menstruating so urine appears blood tinged.   Orders for midline placement for tomorrow (9/21) d/t poor access/stick and frequency of labs.    Insulin gtt running, hourly BG per glucommander. Fluid exchanges initiated at roughly 1500, next BMP draw is at 20:00.   Will continue to monitor.

## 2023-09-20 NOTE — LETTER
September 22, 2023     Patient: Jose Chacko   YOB: 1990   Date of Visit: 9/20/2023       To Whom It May Concern:    It is my medical opinion that Jose Chacko may return to work on 09/25/2023.           Sincerely,      Laura Sexton RN   09/22/2023 @ 2637

## 2023-09-20 NOTE — CONSULTS
Pulmonary / Critical Care Consult Note      Patient Name: Jose Chacko  : 1990  MRN: [id number]  Primary Care Physician:  Provider, No Known  Referring Physician: Suyapa Riddle DO  Date of admission: 2023    Subjective   Subjective     Reason for Consult/ Chief Complaint:   HHS    HPI:  Jose Chacko is a 33 y.o. female with no significant past medical history presented to the ED for fatigue, polyuria, and polydipsia.  Patient reported that she checked her home glucose and found it to be greater than 500.  On arrival to the ED patient was hemodynamically stable.  Her glucose was 8050.  Sodium 123.  Anion gap 14.4.  A1c 9.2.  She also has mild transaminitis.  Showed greater than 1000 glucose with negative ketones.  UDS was positive for cocaine.  She was initiated on insulin drip and admitted to the ICU for higher level of care.    Review of Systems  Constitutional symptoms:  +fatigue; Denied complaints   Ear, nose, throat: Denied complaints  Cardiovascular:  Denied complaints  Respiratory: Denied complaints  Gastrointestinal: Denied complaints  Musculoskeletal: Denied complaints  Genitourinary: +polyuria; Denied complaints  Allergy / Immunology: Denied complaints  Hematologic: Denied complaints  Neurologic: Denied complaints  Skin: Denied complaints  Endocrine: Denied complaints  Psychiatric: Denied complaints      Personal History     Past Medical History:   Diagnosis Date   • Anxiety    • Depression     anxiety   • PTSD (post-traumatic stress disorder)        Past Surgical History:   Procedure Laterality Date   •  SECTION         Family History: family history is not on file. Otherwise pertinent FHx was reviewed and not pertinent to current issue.    Social History:  reports that she has quit smoking. Her smoking use included cigarettes. She smoked an average of .5 packs per day. She has never used smokeless tobacco. She reports that she does not drink alcohol and does not use  drugs.    Home Medications:  ARIPiprazole, amphetamine-dextroamphetamine, buprenorphine-naloxone, clonazePAM, diclofenac, gabapentin, prazosin, and vilazodone    Allergies:  No Known Allergies    Objective    Objective     Vitals:   Temp:  [97.8 °F (36.6 °C)-98.1 °F (36.7 °C)] 97.8 °F (36.6 °C)  Heart Rate:  [] 76  Resp:  [16] 16  BP: (131)/(75) 131/75    Physical Exam:  Vital Signs Reviewed   General:  WDWN, Alert, NAD.    HEENT:  PERRL, EOMI.  OP, nares clear  Neck:  Supple, no JVD, no thyromegaly  Chest:  Clear to auscultation bilaterally, tympanic to percussion bilaterally, no work of breathing noted room air  CV: RRR, no MGR, pulses 2+, equal.  Abd:  Soft, NT, ND, + BS, no HSM, obese  EXT:  no clubbing, no cyanosis, no edema  Neuro:  A&Ox3, CN grossly intact, no focal deficits.  Skin: No rashes or lesions noted    Result Review    Result Review:  I have personally reviewed the results from the time of this admission to 9/20/2023 19:26 EDT and agree with these findings:  []  Laboratory  []  Microbiology  []  Radiology  []  EKG/Telemetry   []  Cardiology/Vascular   []  Pathology  []  Old records  []  Other:  Most notable findings include:     Assessment & Plan   Assessment / Plan     Active Hospital Problems:  Active Hospital Problems    Diagnosis    • **DKA (diabetic ketoacidosis)        Impression:  [facility]  Cocaine, positive on admission  Diabetes, A1c 9.2  History of ADHD on Adderall  History of PTSD/anxiety    Plan:  -Currently on room air  -Pressors: None needed at this time  -Continue home Abilify, viibryd  -Continue insulin drip per [facility] protocol  -Cont aspiration precautions. Keep HOB 30 deg.   -Replace electrolytes PRN to keep K 4.0, Mag 2.0, Phos 4.0.  -Keep glucose 140-180 while in ICU. Cont SSI.  -Transfuse to keep Hgb >7  -Consult case management/social work for substance abuse  -Of note patient takes Adderall, Klonopin, gabapentin, Suboxone at home  -DVT ppx: Lovenox  -GI ppx: None  -Lines:  PIVs    DVT prophylaxis:  Medical DVT prophylaxis orders are present.     Code Status and Medical Interventions:   Ordered at: 09/20/23 1317     Level Of Support Discussed With:    Patient     Code Status (Patient has no pulse and is not breathing):    CPR (Attempt to Resuscitate)     Medical Interventions (Patient has pulse or is breathing):    Full Support        The patient is critically ill in the ICU with insulin drip, substance abuse. multidisciplinary bedside critical care rounds were performed with nursing staff, respiratory therapy, pharmacy, nutritional services, social work. I have personally reviewed the chart, labs and any pertinent imaging available.  I have spent 31 minutes of critical care time, excluding procedures, in the care of this patient.    Electronically signed by Alcides Martinez MD, 09/20/23, 7:26 PM EDT.

## 2023-09-20 NOTE — H&P
[facility]  Date: 2023   Patient Name: Jose Chacko  : 1990  MRN: [id number]  Primary Care Physician:  Provider, No Known  Date of admission: 2023    Subjective hyperglycemia  Subjective     Chief Complaint: Hyperglycemia    HPI: Patient is a 33-year-old female that presents to the emergency room with a 2-week history of increased fatigue, polyuria, polydipsia.  She uses her father's glucometer to measure her blood sugar and found it to be greater than 500.    On arrival to the ED, patient is a temperature of 98.1, pulse of 102, respiratory rate of 16, blood pressure 131/75, and she saturating 97% on room air.  Patient's glucose is 850.  Sodium is 123.  Chloride is 83.  ALT is 43, AST is 48, alkaline phosphatase is 187.    Patient's toxicology screen is positive for cocaine.  Hemoglobin is 11.8.  Personal History     Past Medical History:  Past Medical History:   Diagnosis Date    Anxiety     Depression     anxiety    PTSD (post-traumatic stress disorder)        Past Surgical History:  Past Surgical History:   Procedure Laterality Date     SECTION         Family History:   History reviewed. No pertinent family history.    Social History:   Social History     Socioeconomic History    Marital status: Single   Tobacco Use    Smoking status: Former     Packs/day: 0.50     Types: Cigarettes    Smokeless tobacco: Never   Vaping Use    Vaping Use: Every day    Substances: Nicotine, Flavoring   Substance and Sexual Activity    Alcohol use: Never    Drug use: Never    Sexual activity: Defer       Home Medications:  ARIPiprazole, amphetamine-dextroamphetamine, buprenorphine-naloxone, clonazePAM, diclofenac, gabapentin, prazosin, and vilazodone    Allergies:  No Known Allergies    Review of Systems   All systems were reviewed and negative except for: fatigue, polyuria, polydipsia    Objective   Objective     Vitals:   Temp:  [98.1 °F (36.7 °C)] 98.1 °F  (36.7 °C)  Heart Rate:  [102] 102  Resp:  [16] 16  BP: (131)/(75) 131/75    Physical Exam    Constitutional: Awake, alert, no acute distress   Eyes: Pupils equal, sclerae anicteric, no conjunctival injection   HENT: NCAT, mucous membranes moist   Neck: Supple, no thyromegaly, no lymphadenopathy, trachea midline   Respiratory: Clear to auscultation bilaterally, nonlabored respirations    Cardiovascular: RRR, no murmurs, rubs, or gallops, palpable pedal pulses bilaterally   Gastrointestinal: Positive bowel sounds, soft, nontender, nondistended   Musculoskeletal: No bilateral ankle edema, no clubbing or cyanosis to extremities   Psychiatric: Appropriate affect, cooperative   Neurologic: Oriented x 3, strength symmetric in all extremities, Cranial Nerves grossly intact to confrontation, speech clear   Skin: No rashes     Result Review    Result Review:  I have personally reviewed the results from the time of this admission to 9/20/2023 14:14 EDT and agree with these findings:  [x]  Laboratory  []  Microbiology  [x]  Radiology  []  EKG/Telemetry   []  Cardiology/Vascular   []  Pathology  [x]  Old records  []  Other:      Assessment & Plan   Assessment / Plan   #1 hyperosmolar hyperglycemia   -Insulin drip, aggressive hydration, correct electrolytes  -Follow-up hemoglobin A1c.  Diabetic educator.  -Patient will need admission to the ICU.    #2 anemia  -Work-up ordered    #3 cocaine use  -Patient is on Suboxone at home.  She has relapsed.  I will hold Suboxone.  -Case management/social work consulted    #4 depression and anxiety PTSD  -Continue Rajesh Clark.  Hospital does not have prazosin.  We will order Hytrin substitution.  -We will hold home Klonopin for now.  Patient is very fatigued.      #5 ADH  -Patient on Adderall at home.  We do not have Adderall at the hospital.  Patient will need to supply her with medication.  -Patient's amphetamine screen is negative.    Patient is on multiple prescriptions that  adversely interact with each other: Adderall, Klonopin, gabapentin, Suboxone.  Additionally, patient has used cocaine.  Will defer to the primary rounding team/pharmacy to help establish which medications are needed and which medications patient is actually taking.      DVT prophylaxis:  lovenox    CODE STATUS:    Level Of Support Discussed With: Patient  Code Status (Patient has no pulse and is not breathing): CPR (Attempt to Resuscitate)  Medical Interventions (Patient has pulse or is breathing): Full Support      Admission Status:  I believe this patient meets inpatient status.    Electronically signed by Suyapa Riddle DO, 09/20/23, 1:53 PM EDT.

## 2023-09-20 NOTE — ED PROVIDER NOTES
Time: 11:29 AM EDT  Date of encounter:  2023  Independent Historian/Clinical History and Information was obtained by:   Patient    History is limited by: N/A    Chief Complaint: High blood sugar      History of Present Illness:  Patient is a 33 y.o. year old female who presents to the emergency department for evaluation of high blood sugar.  Patient states she has had 1 to 2 weeks of increasing fatigue, polyuria and polydipsia.  Denies any fever cough or other signs of infection.  States she used her dad's home glucose monitor and found her blood sugar to be greater than 500 so presented to the ED for this.    HPI    Patient Care Team  Primary Care Provider: Provider, Ekaterina Known    Past Medical History:     No Known Allergies  Past Medical History:   Diagnosis Date    Anxiety     Depression     anxiety    PTSD (post-traumatic stress disorder)      Past Surgical History:   Procedure Laterality Date     SECTION       History reviewed. No pertinent family history.    Home Medications:  Prior to Admission medications    Medication Sig Start Date End Date Taking? Authorizing Provider   amitriptyline (ELAVIL) 50 MG tablet Take 50 mg by mouth Every Night.    Emergency, Nurse Epic, RN   ARIPiprazole (ABILIFY) 10 MG tablet  3/4/23   Provider, MD Rayne   ARIPiprazole (ABILIFY) 2 MG tablet Take 1 tablet by mouth Daily.    Emergency, Nurse KEL Murphy   benzonatate (TESSALON) 200 MG capsule Take 1 capsule by mouth 3 (Three) Times a Day As Needed for Cough. 21   Johnnie Love MD brompheniramine-pseudoephedrine-DM 30-2-10 MG/5ML syrup Take 10 mL by mouth 3 (Three) Times a Day As Needed for Congestion. 21   Johnnie Love MD   brompheniramine-pseudoephedrine-DM 30-2-10 MG/5ML syrup Take 10 mL by mouth 4 (Four) Times a Day As Needed for Congestion, Cough or Allergies. 22   Balta Zhong PA   buprenorphine-naloxone (SUBOXONE) 8-2 MG per SL tablet DISSOLVE 2 TABLETS BY MOUTH EVERY DAY  10/5/21   Emergency, Nurse KEL Murphy   clonazePAM (KlonoPIN) 1 MG tablet Take 1 tablet by mouth 2 (Two) Times a Day As Needed.    Emergency, Nurse KEL Murphy   cloNIDine (CATAPRES) 0.1 MG tablet Take 1 tablet by mouth 2 (Two) Times a Day As Needed for High Blood Pressure.    Emergency, Nurse KEL Murphy   diclofenac (VOLTAREN) 50 MG EC tablet Take 1 tablet by mouth Every 12 (Twelve) Hours. 2/14/23   Provider, MD Rayne   gabapentin (NEURONTIN) 600 MG tablet Take 1 tablet by mouth 3 (Three) Times a Day. 10/5/21   Emergency, Nurse Epic, RN   prazosin (MINIPRESS) 2 MG capsule Take 1 capsule by mouth every night at bedtime. 10/5/21   Emergency, Nurse KEL Murphy   Vilazodone HCl (VIIBRYD PO) Take  by mouth.    Emergency, Nurse KEL Murphy        Social History:   Social History     Tobacco Use    Smoking status: Former     Packs/day: 0.50     Types: Cigarettes    Smokeless tobacco: Never   Vaping Use    Vaping Use: Every day    Substances: Nicotine, Flavoring   Substance Use Topics    Alcohol use: Never    Drug use: Never         Review of Systems:  Review of Systems   Constitutional:  Positive for fatigue. Negative for chills and fever.   HENT:  Negative for congestion, rhinorrhea and sore throat.    Eyes:  Negative for photophobia.   Respiratory:  Negative for apnea, cough, chest tightness and shortness of breath.    Cardiovascular:  Negative for chest pain and palpitations.   Gastrointestinal:  Negative for abdominal pain, diarrhea, nausea and vomiting.   Endocrine: Positive for polydipsia and polyuria.   Genitourinary:  Negative for difficulty urinating and dysuria.   Musculoskeletal:  Negative for back pain, joint swelling and myalgias.   Skin:  Negative for color change and wound.   Allergic/Immunologic: Negative.    Neurological:  Negative for seizures and headaches.   Psychiatric/Behavioral: Negative.     All other systems reviewed and are negative.     Physical Exam:  /75 (BP Location: Left arm, Patient  "Position: Sitting)   Pulse 76   Temp 97.8 °F (36.6 °C) (Oral)   Resp 16   Ht 170.2 cm (67\")   Wt 115 kg (253 lb 8.5 oz)   LMP 09/18/2023 (Exact Date)   SpO2 95%   BMI 39.71 kg/m²     Physical Exam  Vitals and nursing note reviewed.   Constitutional:       General: She is awake.      Appearance: Normal appearance.   HENT:      Head: Normocephalic and atraumatic.      Nose: Nose normal.      Mouth/Throat:      Mouth: Mucous membranes are moist.   Eyes:      Extraocular Movements: Extraocular movements intact.      Pupils: Pupils are equal, round, and reactive to light.   Cardiovascular:      Rate and Rhythm: Normal rate and regular rhythm.      Heart sounds: Normal heart sounds.   Pulmonary:      Effort: Pulmonary effort is normal. No respiratory distress.      Breath sounds: Normal breath sounds. No wheezing, rhonchi or rales.   Abdominal:      General: Bowel sounds are normal.      Palpations: Abdomen is soft.      Tenderness: There is no abdominal tenderness. There is no guarding or rebound.      Comments: No rigidity   Musculoskeletal:         General: No tenderness. Normal range of motion.      Cervical back: Normal range of motion and neck supple.   Skin:     General: Skin is warm and dry.      Coloration: Skin is not jaundiced.      Comments: Patient has what appears to be track marks from IV drug abuse to the right dorsum of her wrist and distal forearm.  There is some erythema and induration but no pointing or fluctuance.   Neurological:      General: No focal deficit present.      Mental Status: Mental status is at baseline.   Psychiatric:         Mood and Affect: Mood normal.                Procedures:  Procedures      Medical Decision Making:      Comorbidities that affect care:    Psychiatric illness, substance abuse    External Notes reviewed:    None      The following orders were placed and all results were independently analyzed by me:  Orders Placed This Encounter   Procedures    [facility] Draw "    Comprehensive Metabolic Panel    CBC Auto Differential    Acetone    Osmolality, Serum    Urine Drug Screen - Urine, Clean Catch    Urinalysis With Culture If Indicated - Urine, Clean Catch    Urinalysis, Microscopic Only - Urine, Clean Catch    Phosphorus    Magnesium    Hemoglobin A1c    Basic Metabolic Panel    Magnesium    Phosphorus    Folate    Vitamin B12    Ferritin    Iron Profile    Reticulocytes    Diet: Liquid Diets; Clear Liquid; Fluid Consistency: Thin (IDDSI 0)    Undress & Gown    Vital Signs    Vital Signs    Strict Intake & Output    Daily Weights    Continuous Pulse Oximetry    Corrected Serum Sodium = Measured Sodium + [1.6 x ((Glucose - 100)/100)]    Do NOT Discontinue Insulin Infusion Until 2 Hours After First Dose of Basal SQ Insulin    Prior to Initiating Glucommander™, Ensure All Prior Insulin Orders Are Discontinued    Do Not Start Insulin Infusion if Potassium < 3.3    Use a Dedicated Line for Insulin Infusion (If Possible).  May Use a Carrier Fluid of NS at KVO Rate if Insulin Rate is Insufficient to Maintain IV Patency.  Prime IV Line With Insulin Infusion    Glucommander Must Be Discontinued if Insulin Infusion is Discontinued.  If Insulin Infusion is Restarted, Previous Glucommander Settings Must Be Discontinued and Re-Entered From New Order    Once [facility] Meets Resolution Criteria - Call Provider for Transition Orders From IV to SQ Insulin    Utilize the Start Meal Feature / Meal Bolus Feature in Glucommander if Patient Starts a Diet or Bolus Tube Feedings    Notify Provider - Insulin Infusion    RN to Release PRN POC Glucose Orders Per Glucommander    RN to Order STAT Glucose For Any POC Glucose <10 or >600    If Insulin Infusion is Paused - Follow Glucommander Instructions    Vital Signs    Intake & Output    Weigh Patient    Oral Care    Saline Lock & Maintain IV Access    Code Status and Medical Interventions:    Hospitalist (on-call MD unless specified)    Inpatient Diabetes  Educator Consult    Inpatient Case Management  Consult    Midline Consult    OT Consult: Eval & Treat    PT Consult: Eval & Treat As Tolerated; Discharge Placement Assessment    Oxygen Therapy- Nasal Cannula; Titrate 1-6 LPM Per SpO2; 90 - 95%    POC Glucose STAT    POC Glucose Q1H    POC Glucose Once    POC Glucose Once    POC Glucose PRN    POC Glucose Once    POC Glucose Once    Insert Peripheral IV    Insert Peripheral IV x2    Insert Peripheral IV    Inpatient Admission    CBC & Differential    Green Top (Gel)    Lavender Top    Gold Top - SST    Light Blue Top       Medications Given in the Emergency Department:  Medications   sodium chloride 0.9 % flush 10 mL (has no administration in time range)   sodium chloride 0.9 % flush 10 mL (10 mL Intravenous Given 9/20/23 1346)   sodium chloride 0.9 % flush 10 mL (has no administration in time range)   sodium chloride 0.9 % infusion 40 mL (has no administration in time range)   dextrose (GLUTOSE) oral gel 15 g (has no administration in time range)   dextrose (D50W) (25 g/50 mL) IV injection 10-50 mL (has no administration in time range)   Glucagon (GLUCAGEN) injection 1 mg (has no administration in time range)   sodium chloride 0.9 % bolus (1,000 mL/hr Intravenous Currently Infusing 9/20/23 1532)   sodium chloride 0.9 % infusion (has no administration in time range)   sodium chloride 0.9 % with KCl 20 mEq/L infusion (has no administration in time range)   sodium chloride 0.9 % with KCl 40 mEq/L infusion (has no administration in time range)   dextrose 5 % and sodium chloride 0.9 % infusion (has no administration in time range)   dextrose 5 % and sodium chloride 0.9 % with KCl 20 mEq/L infusion (has no administration in time range)   dextrose 5 % and sodium chloride 0.9 % with KCl 40 mEq/L infusion (has no administration in time range)   sodium chloride 0.45 % infusion (has no administration in time range)   sodium chloride 0.45 % with KCl 20 mEq/L  infusion (250 mL/hr Intravenous New Bag 9/20/23 1618)   sodium chloride 0.45 % 1,000 mL with potassium chloride 40 mEq infusion (has no administration in time range)   dextrose 5 % and sodium chloride 0.45 % infusion (has no administration in time range)   dextrose 5 % and sodium chloride 0.45 % with KCl 20 mEq/L infusion (has no administration in time range)   dextrose 5 % and sodium chloride 0.45 % with KCl 40 mEq/L infusion (has no administration in time range)   insulin regular 1 unit/mL in 0.9% sodium chloride (Glucommander) (9.3 Units/hr Intravenous Currently Infusing 9/20/23 1531)   Potassium Replacement - Follow Nurse / BPA Driven Protocol (has no administration in time range)   Magnesium Standard Dose Replacement - Follow Nurse / BPA Driven Protocol (has no administration in time range)   Phosphorus Replacement - Follow Nurse / BPA Driven Protocol (has no administration in time range)   Calcium Replacement - Follow Nurse / BPA Driven Protocol (has no administration in time range)   sodium chloride 0.9 % flush 10 mL (has no administration in time range)   sodium chloride 0.9 % flush 10 mL (has no administration in time range)   sodium chloride 0.9 % infusion 40 mL (has no administration in time range)   sennosides-docusate (PERICOLACE) 8.6-50 MG per tablet 2 tablet (has no administration in time range)     And   polyethylene glycol (MIRALAX) packet 17 g (has no administration in time range)     And   bisacodyl (DULCOLAX) EC tablet 5 mg (has no administration in time range)     And   bisacodyl (DULCOLAX) suppository 10 mg (has no administration in time range)   Enoxaparin Sodium (LOVENOX) syringe 40 mg (40 mg Subcutaneous Given 9/20/23 1618)   acetaminophen (TYLENOL) tablet 1,000 mg (1,000 mg Oral Given 9/20/23 1618)   melatonin tablet 5 mg (has no administration in time range)   ondansetron (ZOFRAN) tablet 4 mg (has no administration in time range)     Or   ondansetron (ZOFRAN) injection 4 mg (has no  "administration in time range)   ARIPiprazole (ABILIFY) tablet 10 mg (10 mg Oral Given 9/20/23 1618)   terazosin (HYTRIN) capsule 1 mg (has no administration in time range)   vilazodone (VIIBRYD) tablet 40 mg (40 mg Oral Given 9/20/23 1617)   sodium chloride 0.9 % bolus 1,000 mL (1,000 mL Intravenous Not Given 9/20/23 1533)   sodium chloride 0.9 % bolus 1,000 mL (1,000 mL Intravenous New Bag 9/20/23 1106)   insulin regular (humuLIN R,novoLIN R) injection 10 Units (10 Units Intravenous Given 9/20/23 1130)        ED Course:    ED Course as of 09/20/23 1853   Wed Sep 20, 2023   1134 When asked about her right forearm and wrist redness along what appears to be track marks, patient states \"oh, thats a burn\". [RP]      ED Course User Index  [RP] David Ortega MD       Labs:    Lab Results (last 24 hours)       Procedure Component Value Units Date/Time    POC Glucose Once [[id number]]  (Abnormal) Collected: 09/20/23 1019    Specimen: Blood Updated: 09/20/23 1021     Glucose >600 mg/dL      Comment: Serial Number: [id number]:  [id number]       CBC & Differential [[id number]]  (Abnormal) Collected: 09/20/23 1039    Specimen: Blood Updated: 09/20/23 1139    Narrative:      The following orders were created for panel order CBC & Differential.  Procedure                               Abnormality         Status                     ---------                               -----------         ------                     CBC Auto Differential[[id number]]        Abnormal            Final result               Scan Slide[[id number]]                                                                    Please view results for these tests on the individual orders.    Comprehensive Metabolic Panel [[id number]]  (Abnormal) Collected: 09/20/23 1039    Specimen: Blood Updated: 09/20/23 1121     Glucose 850 mg/dL      BUN 15 mg/dL      Creatinine 0.99 mg/dL      Sodium 123 mmol/L      Potassium 4.8 mmol/L      Comment: Slight hemolysis " detected by analyzer. Results may be affected.        Chloride 83 mmol/L      CO2 25.6 mmol/L      Calcium 9.3 mg/dL      Total Protein 7.7 g/dL      Albumin 3.5 g/dL      ALT (SGPT) 43 U/L      AST (SGOT) 48 U/L      Comment: Slight hemolysis detected by analyzer. Results may be affected.        Alkaline Phosphatase 187 U/L      Total Bilirubin 0.3 mg/dL      Globulin 4.2 gm/dL      A/G Ratio 0.8 g/dL      BUN/Creatinine Ratio 15.2     Anion Gap 14.4 mmol/L      eGFR 76.9 mL/min/1.73     Narrative:      GFR Normal >60  Chronic Kidney Disease <60  Kidney Failure <15      Phosphorus [[id number]]  (Abnormal) Collected: 09/20/23 1039    Specimen: Blood Updated: 09/20/23 1328     Phosphorus 4.9 mg/dL     Magnesium [[id number]]  (Normal) Collected: 09/20/23 1039    Specimen: Blood Updated: 09/20/23 1328     Magnesium 1.7 mg/dL     Urine Drug Screen - Urine, Clean Catch [[id number]]  (Abnormal) Collected: 09/20/23 1115    Specimen: Urine, Clean Catch Updated: 09/20/23 1157     Amphet/Methamphet, Screen Negative     Barbiturates Screen, Urine Negative     Benzodiazepine Screen, Urine Negative     Cocaine Screen, Urine Positive     Opiate Screen Negative     THC, Screen, Urine Negative     Methadone Screen, Urine Negative     Oxycodone Screen, Urine Negative     Fentanyl, Urine Negative    Narrative:      Negative Thresholds Per Drugs Screened:    Amphetamines                 500 ng/ml  Barbiturates                 200 ng/ml  Benzodiazepines              100 ng/ml  Cocaine                      300 ng/ml  Methadone                    300 ng/ml  Opiates                      300 ng/ml  Oxycodone                    100 ng/ml  THC                           50 ng/ml  Fentanyl                       5 ng/ml      The Normal Value for all drugs tested is negative. This report includes final unconfirmed screening results to be used for medical treatment purposes only. Unconfirmed results must not be used for non-medical purposes such as  employment or legal testing. Clinical consideration should be applied to any drug of abuse test, particularly when unconfirmed results are used.            Urinalysis With Culture If Indicated - Urine, Clean Catch [[id number]]  (Abnormal) Collected: 09/20/23 1115    Specimen: Urine, Clean Catch Updated: 09/20/23 1202     Color, UA Yellow     Appearance, UA Clear     pH, UA 5.5     Specific Gravity, UA >1.030     Glucose, UA >=1000 mg/dL (3+)     Ketones, UA Negative     Bilirubin, UA Negative     Blood, UA Moderate (2+)     Protein, UA Negative     Leuk Esterase, UA Negative     Nitrite, UA Negative     Urobilinogen, UA 0.2 E.U./dL    Narrative:      In absence of clinical symptoms, the presence of pyuria, bacteria, and/or nitrites on the urinalysis result does not correlate with infection.    Urinalysis, Microscopic Only - Urine, Clean Catch [[id number]]  (Abnormal) Collected: 09/20/23 1115    Specimen: Urine, Clean Catch Updated: 09/20/23 1202     RBC, UA 0-2 /HPF      WBC, UA 0-2 /HPF      Comment: Urine culture not indicated.        Bacteria, UA None Seen /HPF      Squamous Epithelial Cells, UA 0-2 /HPF      Hyaline Casts, UA None Seen /LPF      Methodology Manual Light Microscopy    Acetone [[id number]]  (Normal) Collected: 09/20/23 1120    Specimen: Blood Updated: 09/20/23 1134     Acetone Negative    CBC Auto Differential [[id number]]  (Abnormal) Collected: 09/20/23 1132    Specimen: Blood Updated: 09/20/23 1139     WBC 7.45 10*3/mm3      RBC 4.54 10*6/mm3      Hemoglobin 11.8 g/dL      Hematocrit 36.3 %      MCV 80.0 fL      MCH 26.0 pg      MCHC 32.5 g/dL      RDW 14.6 %      RDW-SD 42.0 fl      MPV 12.2 fL      Platelets 206 10*3/mm3      Neutrophil % 63.9 %      Lymphocyte % 27.5 %      Monocyte % 7.4 %      Eosinophil % 0.7 %      Basophil % 0.4 %      Immature Grans % 0.1 %      Neutrophils, Absolute 4.76 10*3/mm3      Lymphocytes, Absolute 2.05 10*3/mm3      Monocytes, Absolute 0.55 10*3/mm3       Eosinophils, Absolute 0.05 10*3/mm3      Basophils, Absolute 0.03 10*3/mm3      Immature Grans, Absolute 0.01 10*3/mm3      nRBC 0.0 /100 WBC     Osmolality, Serum [[id number]]  (Abnormal) Collected: 09/20/23 1132    Specimen: Blood Updated: 09/20/23 1843     Osmolality 318 mOsm/kg     Hemoglobin A1c [[id number]]  (Abnormal) Collected: 09/20/23 1132    Specimen: Blood Updated: 09/20/23 1735     Hemoglobin A1C 9.20 %     Narrative:      Hemoglobin A1C Ranges:    Increased Risk for Diabetes  5.7% to 6.4%  Diabetes                     >= 6.5%  Diabetic Goal                < 7.0%    Folate [[id number]] Collected: 09/20/23 1132    Specimen: Blood Updated: 09/20/23 1812    Vitamin B12 [[id number]] Collected: 09/20/23 1132    Specimen: Blood Updated: 09/20/23 1812    Iron Profile [[id number]] Collected: 09/20/23 1132    Specimen: Blood Updated: 09/20/23 1812    POC Glucose Once [[id number]]  (Abnormal) Collected: 09/20/23 1200    Specimen: Blood Updated: 09/20/23 1206     Glucose >600 mg/dL      Comment: Serial Number: [id number]:  [id number]       POC Glucose Once [[id number]]  (Abnormal) Collected: 09/20/23 1320    Specimen: Blood Updated: 09/20/23 1323     Glucose 558 mg/dL      Comment: Serial Number: [id number]:  [id number]       POC Glucose Once [[id number]]  (Abnormal) Collected: 09/20/23 1349    Specimen: Blood Updated: 09/20/23 1423     Glucose 459 mg/dL      Comment: Serial Number: [id number]:  [id number]       Basic Metabolic Panel [[id number]]  (Abnormal) Collected: 09/20/23 1451    Specimen: Blood Updated: 09/20/23 1531     Glucose 458 mg/dL      BUN 15 mg/dL      Creatinine 0.74 mg/dL      Sodium 132 mmol/L      Potassium 4.1 mmol/L      Comment: Slight hemolysis detected by analyzer. Results may be affected.        Chloride 97 mmol/L      CO2 23.4 mmol/L      Calcium 8.8 mg/dL      BUN/Creatinine Ratio 20.3     Anion Gap 11.6 mmol/L      eGFR 109.0 mL/min/1.73     Narrative:      GFR Normal  >60  Chronic Kidney Disease <60  Kidney Failure <15      Magnesium [[id number]]  (Normal) Collected: 09/20/23 1451    Specimen: Blood Updated: 09/20/23 1525     Magnesium 1.7 mg/dL     Phosphorus [[id number]]  (Normal) Collected: 09/20/23 1451    Specimen: Blood Updated: 09/20/23 1525     Phosphorus 3.5 mg/dL     Ferritin [[id number]]  (Normal) Collected: 09/20/23 1451    Specimen: Blood Updated: 09/20/23 1527     Ferritin 119.80 ng/mL     Narrative:      <12 ng/mL usually associated with Iron Deficiency Anemia. Above normal range levels may be due to Hepatic and/or Chronic Inflammatory Disease.  Results may be falsely decreased if patient taking Biotin.               Imaging:    No Radiology Exams Resulted Within Past 24 Hours      Differential Diagnosis and Discussion:    Metabolic: Differential diagnosis includes but is not limited to hypertension, hyperglycemia, hyperkalemia, hypocalcemia, metabolic acidosis, hypokalemia, hypoglycemia, malnutrition, hypothyroidism, hyperthyroidism, and adrenal insufficiency.     All labs were reviewed and interpreted by me.    MDM     Amount and/or Complexity of Data Reviewed  Clinical lab tests: reviewed         Critical Care Note: Total Critical Care time of 35 minutes. Total critical care time documented does not include time spent on separately billed procedures for services of nurses or physician assistants. I personally saw and examined the patient. I have reviewed all diagnostic interpretations and treatment plans as written. I was present for the key portions of any procedures performed and the inclusive time noted in any critical care statement. Critical care time includes patient management by me, time spent at the patients bedside,  time to review lab and imaging results, discussing patient care, documentation in the medical record, and time spent with family or caregiver.    Patient Care Considerations:          Consultants/Shared Management Plan:    Hospitalist: I have  discussed the case with Dr. Thomason who agrees to accept the patient for admission.    Social Determinants of Health:    Patient is independent, reliable, and has access to care.       Disposition and Care Coordination:    Admit:   Through independent evaluation of the patient's history, physical, and imperical data, the patient meets criteria for observation/admission to the hospital.        Final diagnoses:   Severe hyperglycemia due to diabetes mellitus   New onset type 2 diabetes mellitus        ED Disposition       ED Disposition   Decision to Admit    Condition   --    Comment   Level of Care: Critical Care [6]   Diagnosis: DKA (diabetic ketoacidosis) [670478]   Admitting Physician: BARBY THOMAOSN [[id number]]   Attending Physician: BARBY THOMASON [[id number]]   Certification: I Certify That Inpatient Hospital Services Are Medically Necessary For Greater Than 2 Midnights                 This medical record created using voice recognition software.             David Ortega MD  09/20/23 1718

## 2023-09-21 LAB
ANION GAP SERPL CALCULATED.3IONS-SCNC: 6.2 MMOL/L (ref 5–15)
ANION GAP SERPL CALCULATED.3IONS-SCNC: 7.5 MMOL/L (ref 5–15)
BASOPHILS # BLD AUTO: 0.03 10*3/MM3 (ref 0–0.2)
BASOPHILS NFR BLD AUTO: 0.6 % (ref 0–1.5)
BUN SERPL-MCNC: 10 MG/DL (ref 6–20)
BUN SERPL-MCNC: 12 MG/DL (ref 6–20)
BUN/CREAT SERPL: 19.2 (ref 7–25)
BUN/CREAT SERPL: 21.4 (ref 7–25)
CALCIUM SPEC-SCNC: 8.3 MG/DL (ref 8.6–10.5)
CALCIUM SPEC-SCNC: 8.4 MG/DL (ref 8.6–10.5)
CHLORIDE SERPL-SCNC: 106 MMOL/L (ref 98–107)
CHLORIDE SERPL-SCNC: 106 MMOL/L (ref 98–107)
CO2 SERPL-SCNC: 23.5 MMOL/L (ref 22–29)
CO2 SERPL-SCNC: 26.8 MMOL/L (ref 22–29)
CREAT SERPL-MCNC: 0.52 MG/DL (ref 0.57–1)
CREAT SERPL-MCNC: 0.56 MG/DL (ref 0.57–1)
DEPRECATED RDW RBC AUTO: 43.1 FL (ref 37–54)
EGFRCR SERPLBLD CKD-EPI 2021: 123 ML/MIN/1.73
EGFRCR SERPLBLD CKD-EPI 2021: 125.2 ML/MIN/1.73
EOSINOPHIL # BLD AUTO: 0.23 10*3/MM3 (ref 0–0.4)
EOSINOPHIL NFR BLD AUTO: 4.3 % (ref 0.3–6.2)
ERYTHROCYTE [DISTWIDTH] IN BLOOD BY AUTOMATED COUNT: 14.7 % (ref 12.3–15.4)
GLUCOSE BLDC GLUCOMTR-MCNC: 107 MG/DL (ref 70–99)
GLUCOSE BLDC GLUCOMTR-MCNC: 108 MG/DL (ref 70–99)
GLUCOSE BLDC GLUCOMTR-MCNC: 108 MG/DL (ref 70–99)
GLUCOSE BLDC GLUCOMTR-MCNC: 115 MG/DL (ref 70–99)
GLUCOSE BLDC GLUCOMTR-MCNC: 117 MG/DL (ref 70–99)
GLUCOSE BLDC GLUCOMTR-MCNC: 119 MG/DL (ref 70–99)
GLUCOSE BLDC GLUCOMTR-MCNC: 122 MG/DL (ref 70–99)
GLUCOSE BLDC GLUCOMTR-MCNC: 137 MG/DL (ref 70–99)
GLUCOSE BLDC GLUCOMTR-MCNC: 169 MG/DL (ref 70–99)
GLUCOSE BLDC GLUCOMTR-MCNC: 207 MG/DL (ref 70–99)
GLUCOSE BLDC GLUCOMTR-MCNC: 229 MG/DL (ref 70–99)
GLUCOSE BLDC GLUCOMTR-MCNC: 238 MG/DL (ref 70–99)
GLUCOSE BLDC GLUCOMTR-MCNC: 256 MG/DL (ref 70–99)
GLUCOSE BLDC GLUCOMTR-MCNC: 303 MG/DL (ref 70–99)
GLUCOSE SERPL-MCNC: 103 MG/DL (ref 65–99)
GLUCOSE SERPL-MCNC: 117 MG/DL (ref 65–99)
HCT VFR BLD AUTO: 32.6 % (ref 34–46.6)
HGB BLD-MCNC: 10.3 G/DL (ref 12–15.9)
IMM GRANULOCYTES # BLD AUTO: 0.02 10*3/MM3 (ref 0–0.05)
IMM GRANULOCYTES NFR BLD AUTO: 0.4 % (ref 0–0.5)
LYMPHOCYTES # BLD AUTO: 2.49 10*3/MM3 (ref 0.7–3.1)
LYMPHOCYTES NFR BLD AUTO: 46.9 % (ref 19.6–45.3)
MAGNESIUM SERPL-MCNC: 1.8 MG/DL (ref 1.6–2.6)
MAGNESIUM SERPL-MCNC: 1.9 MG/DL (ref 1.6–2.6)
MCH RBC QN AUTO: 25.8 PG (ref 26.6–33)
MCHC RBC AUTO-ENTMCNC: 31.6 G/DL (ref 31.5–35.7)
MCV RBC AUTO: 81.5 FL (ref 79–97)
MONOCYTES # BLD AUTO: 0.64 10*3/MM3 (ref 0.1–0.9)
MONOCYTES NFR BLD AUTO: 12.1 % (ref 5–12)
NEUTROPHILS NFR BLD AUTO: 1.9 10*3/MM3 (ref 1.7–7)
NEUTROPHILS NFR BLD AUTO: 35.7 % (ref 42.7–76)
NRBC BLD AUTO-RTO: 0 /100 WBC (ref 0–0.2)
PHOSPHATE SERPL-MCNC: 2.9 MG/DL (ref 2.5–4.5)
PHOSPHATE SERPL-MCNC: 3.1 MG/DL (ref 2.5–4.5)
PLATELET # BLD AUTO: 174 10*3/MM3 (ref 140–450)
PMV BLD AUTO: 12 FL (ref 6–12)
POTASSIUM SERPL-SCNC: 3.7 MMOL/L (ref 3.5–5.2)
POTASSIUM SERPL-SCNC: 3.9 MMOL/L (ref 3.5–5.2)
RBC # BLD AUTO: 4 10*6/MM3 (ref 3.77–5.28)
SODIUM SERPL-SCNC: 137 MMOL/L (ref 136–145)
SODIUM SERPL-SCNC: 139 MMOL/L (ref 136–145)
WBC NRBC COR # BLD: 5.31 10*3/MM3 (ref 3.4–10.8)

## 2023-09-21 PROCEDURE — 83735 ASSAY OF MAGNESIUM: CPT | Performed by: EMERGENCY MEDICINE

## 2023-09-21 PROCEDURE — 97165 OT EVAL LOW COMPLEX 30 MIN: CPT

## 2023-09-21 PROCEDURE — 85025 COMPLETE CBC W/AUTO DIFF WBC: CPT | Performed by: STUDENT IN AN ORGANIZED HEALTH CARE EDUCATION/TRAINING PROGRAM

## 2023-09-21 PROCEDURE — 25010000002 ENOXAPARIN PER 10 MG: Performed by: HOSPITALIST

## 2023-09-21 PROCEDURE — 63710000001 INSULIN DETEMIR PER 5 UNITS: Performed by: STUDENT IN AN ORGANIZED HEALTH CARE EDUCATION/TRAINING PROGRAM

## 2023-09-21 PROCEDURE — 82948 REAGENT STRIP/BLOOD GLUCOSE: CPT

## 2023-09-21 PROCEDURE — 80048 BASIC METABOLIC PNL TOTAL CA: CPT | Performed by: EMERGENCY MEDICINE

## 2023-09-21 PROCEDURE — 99291 CRITICAL CARE FIRST HOUR: CPT | Performed by: STUDENT IN AN ORGANIZED HEALTH CARE EDUCATION/TRAINING PROGRAM

## 2023-09-21 PROCEDURE — 97161 PT EVAL LOW COMPLEX 20 MIN: CPT

## 2023-09-21 PROCEDURE — 63710000001 INSULIN LISPRO (HUMAN) PER 5 UNITS: Performed by: STUDENT IN AN ORGANIZED HEALTH CARE EDUCATION/TRAINING PROGRAM

## 2023-09-21 PROCEDURE — 84100 ASSAY OF PHOSPHORUS: CPT | Performed by: EMERGENCY MEDICINE

## 2023-09-21 RX ORDER — INSULIN LISPRO 100 [IU]/ML
2-7 INJECTION, SOLUTION INTRAVENOUS; SUBCUTANEOUS
Status: DISCONTINUED | OUTPATIENT
Start: 2023-09-21 | End: 2023-09-22 | Stop reason: HOSPADM

## 2023-09-21 RX ORDER — GLIPIZIDE 5 MG/1
5 TABLET ORAL
Status: DISCONTINUED | OUTPATIENT
Start: 2023-09-22 | End: 2023-09-21

## 2023-09-21 RX ORDER — BUPRENORPHINE HYDROCHLORIDE AND NALOXONE HYDROCHLORIDE DIHYDRATE 8; 2 MG/1; MG/1
2 TABLET SUBLINGUAL DAILY
Status: DISCONTINUED | OUTPATIENT
Start: 2023-09-21 | End: 2023-09-22 | Stop reason: HOSPADM

## 2023-09-21 RX ORDER — CLONAZEPAM 0.5 MG/1
1 TABLET ORAL 3 TIMES DAILY PRN
Status: DISCONTINUED | OUTPATIENT
Start: 2023-09-21 | End: 2023-09-22 | Stop reason: HOSPADM

## 2023-09-21 RX ORDER — NICOTINE POLACRILEX 4 MG
15 LOZENGE BUCCAL
Status: DISCONTINUED | OUTPATIENT
Start: 2023-09-21 | End: 2023-09-22 | Stop reason: HOSPADM

## 2023-09-21 RX ORDER — DEXTROSE MONOHYDRATE 25 G/50ML
25 INJECTION, SOLUTION INTRAVENOUS
Status: DISCONTINUED | OUTPATIENT
Start: 2023-09-21 | End: 2023-09-22 | Stop reason: HOSPADM

## 2023-09-21 RX ORDER — GLIPIZIDE 5 MG/1
5 TABLET ORAL
Status: DISCONTINUED | OUTPATIENT
Start: 2023-09-21 | End: 2023-09-22

## 2023-09-21 RX ORDER — GABAPENTIN 300 MG/1
600 CAPSULE ORAL EVERY 8 HOURS SCHEDULED
Status: DISCONTINUED | OUTPATIENT
Start: 2023-09-21 | End: 2023-09-22 | Stop reason: HOSPADM

## 2023-09-21 RX ADMIN — Medication 10 ML: at 21:18

## 2023-09-21 RX ADMIN — INSULIN LISPRO 5 UNITS: 100 INJECTION, SOLUTION INTRAVENOUS; SUBCUTANEOUS at 21:18

## 2023-09-21 RX ADMIN — ACETAMINOPHEN 1000 MG: 500 TABLET ORAL at 18:48

## 2023-09-21 RX ADMIN — ACETAMINOPHEN 1000 MG: 500 TABLET ORAL at 21:18

## 2023-09-21 RX ADMIN — INSULIN DETEMIR 8 UNITS: 100 INJECTION, SOLUTION SUBCUTANEOUS at 09:44

## 2023-09-21 RX ADMIN — ARIPIPRAZOLE 10 MG: 10 TABLET ORAL at 08:26

## 2023-09-21 RX ADMIN — TERAZOSIN HYDROCHLORIDE 1 MG: 1 CAPSULE ORAL at 21:18

## 2023-09-21 RX ADMIN — GABAPENTIN 600 MG: 300 CAPSULE ORAL at 21:18

## 2023-09-21 RX ADMIN — GLIPIZIDE 5 MG: 5 TABLET ORAL at 18:48

## 2023-09-21 RX ADMIN — INSULIN LISPRO 3 UNITS: 100 INJECTION, SOLUTION INTRAVENOUS; SUBCUTANEOUS at 11:40

## 2023-09-21 RX ADMIN — VILAZODONE HYDROCHLORIDE 40 MG: 20 TABLET ORAL at 08:26

## 2023-09-21 RX ADMIN — ACETAMINOPHEN 1000 MG: 500 TABLET ORAL at 08:26

## 2023-09-21 RX ADMIN — CLONAZEPAM 1 MG: 0.5 TABLET ORAL at 14:23

## 2023-09-21 RX ADMIN — INSULIN LISPRO 3 UNITS: 100 INJECTION, SOLUTION INTRAVENOUS; SUBCUTANEOUS at 18:48

## 2023-09-21 RX ADMIN — ENOXAPARIN SODIUM 40 MG: 100 INJECTION SUBCUTANEOUS at 08:26

## 2023-09-21 RX ADMIN — BUPRENORPHINE HYDROCHLORIDE AND NALOXONE HYDROCHLORIDE DIHYDRATE 2 TABLET: 8; 2 TABLET SUBLINGUAL at 09:44

## 2023-09-21 RX ADMIN — GABAPENTIN 600 MG: 300 CAPSULE ORAL at 14:23

## 2023-09-21 RX ADMIN — POTASSIUM CHLORIDE 40 MEQ: 1500 TABLET, EXTENDED RELEASE ORAL at 01:25

## 2023-09-21 NOTE — PROGRESS NOTES
[facility]   Hospitalist Progress Note  Date: 2023  Patient Name: Jose Chacko  : 1990  MRN: [id number]  Date of admission: 2023      Subjective   Subjective     Chief complaint: Hyperglycemia    Summary:  33-year-old female history of anxiety, PTSD, ADHD, depression, ex-smoker, presented with chief complaint of hypoglycemia, blood sugars found to be greater than 500, with significant ANDRIA, with hyperosmolar hyperglycemia state, placed in the critical care unit on continuous infusion of insulin,, with positive urinary toxicology for cocaine, with admitted relapse, takes Suboxone at home, transitioning from continuous insulin to basal bolus.  A1c 9.2.  Can try off insulin with orals at time of disposition.    Interval follow-up: Seen and examined this morning, doing better, blood sugars are better controlled, creatinine down to 0.52, potassium 3.9, sodium 139, white blood cell count 5000.  Telemetry reviewed, no acute major rhythmic events, sinus rhythm in the 70s.  Urine is particularly negative for ketones.  We will try her on oral hypoglycemics with A1c less than 10.    Review of systems:  All systems reviewed and negative except generalized fatigue, generalized weakness, hunger    Objective   Objective     Vitals:   Temp:  [97.6 °F (36.4 °C)-98.7 °F (37.1 °C)] 97.6 °F (36.4 °C)  Heart Rate:  [70-83] 74  BP: ()/(64-83) 122/64  Physical Exam    Constitutional: Awake, alert, no acute distress   Eyes: Pupils equal, sclerae anicteric, no conjunctival injection   HENT: NCAT, mucous membranes moist, poor dentition   Neck: Supple, no thyromegaly, no lymphadenopathy, trachea midline   Respiratory: Clear to auscultation bilaterally, nonlabored respirations    Cardiovascular: RRR, no murmurs, rubs, or gallops, palpable pedal pulses bilaterally   Gastrointestinal: Positive bowel sounds, soft, nontender, nondistended   Musculoskeletal: No bilateral ankle edema, no clubbing or cyanosis to  extremities   Psychiatric: Appropriate affect, cooperative   Neurologic: Oriented x 3, strength symmetric in all extremities, Cranial Nerves grossly intact to confrontation, speech clear   Skin: No rashes visible on exposed skin    Result Review    Result Review:  I have personally reviewed the pertinent results from the past 24 hours to 9/21/2023 14:41 EDT and agree with these findings:  [x]  Laboratory   CBC          9/20/2023    11:32 9/21/2023    04:16   CBC   WBC 7.45  5.31    RBC 4.54  4.00    Hemoglobin 11.8  10.3    Hematocrit 36.3  32.6    MCV 80.0  81.5    MCH 26.0  25.8    MCHC 32.5  31.6    RDW 14.6  14.7    Platelets 206  174      BMP          9/20/2023    10:39 9/20/2023    14:51 9/20/2023    19:29 9/21/2023    00:22 9/21/2023    04:15   BMP   BUN 15  15  12  12  10    Creatinine 0.99  0.74  0.66  0.56  0.52    Sodium 123  132  135  137  139    Potassium 4.8  4.1  3.4  3.7  3.9    Chloride 83  97  100  106  106    CO2 25.6  23.4  26.4  23.5  26.8    Calcium 9.3  8.8  8.6  8.4  8.3      LIVER FUNCTION TESTS:      Lab 09/20/23  1039   TOTAL PROTEIN 7.7   ALBUMIN 3.5   GLOBULIN 4.2   ALT (SGPT) 43*   AST (SGOT) 48*   BILIRUBIN 0.3   ALK PHOS 187*       [x]  Microbiology No results found for: ACANTHNAEG, AFBCX, BPERTUSSISCX, BLOODCX  No results found for: BCIDPCR, CXREFLEX, CSFCX, CULTURETIS  No results found for: CULTURES, HSVCX, URCX  No results found for: EYECULTURE, GCCX, HSVCULTURE, LABHSV  No results found for: LEGIONELLA, MRSACX, MUMPSCX, MYCOPLASCX  No results found for: NOCARDIACX, STOOLCX  No results found for: THROATCX, UNSTIMCULT, URINECX, CULTURE, VZVCULTUR  No results found for: VIRALCULTU, WOUNDCX    [x]  Radiology No results found.    [x]  EKG/Telemetry   []  Cardiology/Vascular   []  Pathology  [x]  Old records  []  Other:    Assessment & Plan   Assessment / Plan     Assessment/Plan:  Assessment:  HHS  New diagnosis diabetes mellitus A1c  9.2  ADHD  PTSD  Anxiety  Depression  Ex-smoker  Current drug user relapse with cocaine  Long-term Suboxone therapy    Plan:  Labs and imaging reviewed  Transferred out of ICU to monitored floor  Stop Levemir 8 units daily  Continue insulin sliding scale coverage  We will start on glipizide 5 mg twice a day, can uptitrate to 10 mg twice a day  Inpatient diabetes management consult  Reconciled home medications, resumed on terazosin, fibrate, gabapentin  Resumed on Suboxone  Resumed on Abilify  Replace electrolytes according  A.m. labs  Full code  DVT prophylaxis with Lovenox  Clinical course dictate further management  Discussed with nurse at the bedside  Discussed with Dr. Mclean, critical care consulted    DVT prophylaxis:  Medical DVT prophylaxis orders are present.    CODE STATUS:   Level Of Support Discussed With: Patient  Code Status (Patient has no pulse and is not breathing): CPR (Attempt to Resuscitate)  Medical Interventions (Patient has pulse or is breathing): Full Support        Electronically signed by Eliza Tran MD, 09/21/23, 2:41 PM EDT.    Portions of this documentation were transcribed electronically from a voice recognition software.  I confirm all data accurately represents the service(s) I performed at today's visit.

## 2023-09-21 NOTE — THERAPY EVALUATION
Acute Care - Physical Therapy Initial Evaluation  RAHAT Zimmerman     Patient Name: Jose Chacko  : 1990  MRN: [id number]  Today's Date: 2023      Visit Dx:     ICD-10-CM ICD-9-CM   1. Severe hyperglycemia due to diabetes mellitus  E11.65 250.00   2. New onset type 2 diabetes mellitus  E11.9 250.00   3. Decreased activities of daily living (ADL)  Z78.9 V49.89   4. Difficulty walking  R26.2 719.7     Patient Active Problem List   Diagnosis    DKA (diabetic ketoacidosis)     Past Medical History:   Diagnosis Date    Anxiety     Depression     anxiety    PTSD (post-traumatic stress disorder)      Past Surgical History:   Procedure Laterality Date     SECTION       PT Assessment (last 12 hours)       PT Evaluation and Treatment       Row Name 23 133          Physical Therapy Time and Intention    Document Type evaluation  -AV     Mode of Treatment individual therapy;physical therapy  -AV       Row Name 23 306          General Information    Patient Profile Reviewed yes  -AV     Prior Level of Function independent:;all household mobility;gait;transfer;ADL's  Ambulated without an assistive device. No home O2.  -AV     Existing Precautions/Restrictions no known precautions/restrictions  -AV       Row Name 23 133          Living Environment    Current Living Arrangements home  -AV     Home Accessibility stairs within home;stairs to enter home  -AV     People in Home parent(s)  -AV       Row Name 23 133          Home Main Entrance    Number of Stairs, Main Entrance three  -AV       Row Name 23 1337          Stairs Within Home, Primary    Stairs, Within Home, Primary Flight to downstairs bedroom  -AV       Row Name 23 133          Pain    Pretreatment Pain Rating 0/10 - no pain  -AV     Posttreatment Pain Rating 0/10 - no pain  -AV       Row Name 23 1351          Range of Motion (ROM)    Range of Motion bilateral lower extremities;ROM is WFL  -AV       Row  Name 09/21/23 1351          Strength (Manual Muscle Testing)    Strength (Manual Muscle Testing) bilateral lower extremities;strength is WFL  -AV       Row Name 09/21/23 1351          Bed Mobility    Bed Mobility bed mobility (all) activities  -AV     All Activities, [place] (Bed Mobility) independent  -AV       Row Name 09/21/23 1351          Transfers    Transfers sit-stand transfer;stand-sit transfer  -AV       Row Name 09/21/23 1351          Sit-Stand Transfer    Sit-Stand [place] (Transfers) independent  -AV     Assistive Device (Sit-Stand Transfers) --  No AD  -AV       Row Name 09/21/23 1351          Stand-Sit Transfer    Stand-Sit [place] (Transfers) independent  -AV     Assistive Device (Stand-Sit Transfers) --  No AD  -AV       Row Name 09/21/23 1351          Gait/Stairs (Locomotion)    Comment, (Gait/Stairs) Patient is independent with all functional mobility and ambulation without the use of an assistive device.  -AV       Row Name 09/21/23 [facility]          Balance    Balance Assessment standing dynamic balance  -AV     Dynamic Standing Balance independent  -AV     Position/Device Used, Standing Balance unsupported  -AV       Row Name 09/21/23 [facility]          Plan of Care Review    Plan of Care Reviewed With patient  -AV     Progress no change  -AV     Outcome Evaluation Patient not appropriate for skilled PT services as she has not had a decline in functional mobility. Patient is currently independent with all transfers and ambulation. She is safe to return home once medically able. D/C PT order at this time with patient in agreement.  -AV       Row Name 09/21/23 1351          Therapy Assessment/Plan (PT)    Criteria for Skilled Interventions Met (PT) no problems identified which require skilled intervention  -AV     Therapy Frequency (PT) evaluation only  -AV       Row Name 09/21/23 1351          PT Evaluation Complexity    History, PT Evaluation Complexity no personal factors and/or  comorbidities  -AV     Examination of Body Systems (PT Eval Complexity) total of 4 or more elements  -AV     Clinical Presentation (PT Evaluation Complexity) stable  -AV     Clinical Decision Making (PT Evaluation Complexity) low complexity  -AV     Overall Complexity (PT Evaluation Complexity) low complexity  -AV       Row Name 09/21/23 1351          Therapy Plan Review/Discharge Plan (PT)    Therapy Plan Review (PT) evaluation/treatment results reviewed;patient  -AV               User Key  (r) = Recorded By, (t) = Taken By, (c) = Cosigned By      Initials Name Provider Type    AV Isaac Haddad, PT Physical Therapist                    Physical Therapy Education       Title: PT OT SLP Therapies (In Progress)       Topic: Physical Therapy (In Progress)       Point: Mobility training (Done)       Learning Progress Summary             Patient Acceptance, E,TB, VU by AV at 9/21/2023 4477                         Point: Home exercise program (Not Started)       Learner Progress:  Not documented in this visit.              Point: Body mechanics (Not Started)       Learner Progress:  Not documented in this visit.              Point: Precautions (Not Started)       Learner Progress:  Not documented in this visit.                              User Key       Initials Effective Dates Name Provider Type Discipline    AV 06/11/21 -  Isaac aHddad, PT Physical Therapist PT                  PT Recommendation and Plan  Anticipated Discharge Disposition (PT): home  Therapy Frequency (PT): evaluation only  Plan of Care Reviewed With: patient  Progress: no change  Outcome Evaluation: Patient not appropriate for skilled PT services as she has not had a decline in functional mobility. Patient is currently independent with all transfers and ambulation. She is safe to return home once medically able. D/C PT order at this time with patient in agreement.   Outcome Measures       Row Name 09/21/23 1028             How much help from  another person do you currently need...    Turning from your back to your side while in flat bed without using bedrails? 4  -AV      Moving from lying on back to sitting on the side of a flat bed without bedrails? 4  -AV      Moving to and from a bed to a chair (including a wheelchair)? 4  -AV      Standing up from a chair using your arms (e.g., wheelchair, bedside chair)? 4  -AV      Climbing 3-5 steps with a railing? 4  -AV      To walk in hospital room? 4  -AV      AM-PAC 6 Clicks Score (PT) 24  -AV         Functional Assessment    Outcome Measure Options AM-PAC 6 Clicks Basic Mobility (PT)  -AV                User Key  (r) = Recorded By, (t) = Taken By, (c) = Cosigned By      Initials Name Provider Type    Isaac England, PT Physical Therapist                     Time Calculation:    PT Charges       Row Name 09/21/23 1356             Time Calculation    PT Received On 09/21/23  -AV         Untimed Charges    PT Eval/Re-eval Minutes 32  -AV         Total Minutes    Untimed Charges Total Minutes 32  -AV       Total Minutes 32  -AV                User Key  (r) = Recorded By, (t) = Taken By, (c) = Cosigned By      Initials Name Provider Type    Isaac England, JANIE Physical Therapist                  Therapy Charges for Today       Code Description Service Date Service Provider Modifiers Qty    [id number]  PT EVAL LOW COMPLEXITY 3 9/21/2023 Isaac Haddad, PT GP 1            PT G-Codes  Outcome Measure Options: AM-PAC 6 Clicks Basic Mobility (PT)  AM-PAC 6 Clicks Score (PT): 24  AM-PAC 6 Clicks Score (OT): 24    Isaac Haddad PT  9/21/2023

## 2023-09-21 NOTE — THERAPY EVALUATION
Patient Name: Jose Chacko  : 1990    MRN: [id number]                              Today's Date: 2023       Admit Date: 2023    Visit Dx:     ICD-10-CM ICD-9-CM   1. Severe hyperglycemia due to diabetes mellitus  E11.65 250.00   2. New onset type 2 diabetes mellitus  E11.9 250.00   3. Decreased activities of daily living (ADL)  Z78.9 V49.89     Patient Active Problem List   Diagnosis    DKA (diabetic ketoacidosis)     Past Medical History:   Diagnosis Date    Anxiety     Depression     anxiety    PTSD (post-traumatic stress disorder)      Past Surgical History:   Procedure Laterality Date     SECTION        General Information       Row Name 23 1333          OT Time and Intention    Document Type evaluation  -     Mode of Treatment individual therapy;occupational therapy  -       Row Name 23 1331          General Information    Patient Profile Reviewed yes  -LF     Prior Level of Function --  (I) with ADLs, ambulated w/o a device, has a step over tub where she stands to shower, standard commode, stands to groom, drives, and no home O2. Works at the [employer] per EMR.  -     Existing Precautions/Restrictions no known precautions/restrictions  -     Barriers to Rehab none identified  -       Row Name 23 7724          Occupational Profile    Reason for Services/Referral (Occupational Profile) Patient is a 33 year old female admitted to [facility] for hyperglycemia on 2023. Occupational therapy consulted due to recent decline in ADLs/functional transfers. No previous occupational therapy services for current condition.  -       Row Name 23 1337          Living Environment    People in Home parent(s)  -       Row Name 23 1338          Home Main Entrance    Number of Stairs, Main Entrance three  -       Row Name 23 1331          Stairs Within Home, Primary    Stairs, Within Home, Primary 12-15 to her bedroom  downstairs  -     Stair Railings, Within Home, Primary railings safe and in good condition  -       Row Name 09/21/23 1334          Cognition    Orientation Status (Cognition) oriented x 4  -       Row Name 09/21/23 1334          Safety Issues, Functional Mobility    Impairments Affecting Function (Mobility) other (see comments)  N/A  -               User Key  (r) = Recorded By, (t) = Taken By, (c) = Cosigned By      Initials Name Provider Type     Payton Gabriel OT Occupational Therapist                     Mobility/ADL's       Row Name 09/21/23 1335          Bed Mobility    Bed Mobility bed mobility (all) activities  -     All Activities, [place] (Bed Mobility) independent  -       Row Name 09/21/23 1335          Transfers    Transfers sit-stand transfer;stand-sit transfer  -       Row Name 09/21/23 1335          Sit-Stand Transfer    Sit-Stand [place] (Transfers) independent  -       Row Name 09/21/23 1335          Stand-Sit Transfer    Stand-Sit [place] (Transfers) independent  -       Row Name 09/21/23 1335          Functional Mobility    Functional Mobility- Ind. Level independent  -       Row Name 09/21/23 1335          Activities of Daily Living    BADL Assessment/Intervention bathing;upper body dressing;lower body dressing;grooming;feeding;toileting  -       Row Name 09/21/23 1335          Bathing Assessment/Intervention    [place] Level (Bathing) bathing skills;upper body;lower body;independent  -[facility] Name 09/21/23 1335          Upper Body Dressing Assessment/Training    [place] Level (Upper Body Dressing) upper body dressing skills;independent  -[facility] Name 09/21/23 1335          Lower Body Dressing Assessment/Training    [place] Level (Lower Body Dressing) lower body dressing skills;independent  -[facility] Name 09/21/23 1335          Grooming Assessment/Training    [place] Level (Grooming) grooming skills;independent  -        Row Name 09/21/23 1335          Self-Feeding Assessment/Training    [place] Level (Feeding) feeding skills;independent  -LF       Row Name 09/21/23 1335          Toileting Assessment/Training    [place] Level (Toileting) toileting skills;independent  -LF               User Key  (r) = Recorded By, (t) = Taken By, (c) = Cosigned By      Initials Name Provider Type     Payton Gabriel OT Occupational Therapist                   Obj/Interventions       Row Name 09/21/23 1336          Sensory Assessment (Somatosensory)    Sensory Assessment (Somatosensory) UE sensation intact  -LF       Row Name 09/21/23 1336          Vision Assessment/Intervention    Visual Impairment/Limitations WFL  -LF       Row Name 09/21/23 1336          Range of Motion Comprehensive    General Range of Motion bilateral upper extremity ROM WFL  -LF       Row Name 09/21/23 1336          Strength Comprehensive (MMT)    Comment, General Manual Muscle Testing (MMT) Assessment 5/5 BUEs  -LF       Row Name 09/21/23 1336          Motor Skills    Motor Skills coordination;functional endurance  -LF     Coordination WFL  -     Functional Endurance Good for BADLs  -LF       Row Name 09/21/23 1336          Balance    Balance Assessment sitting dynamic balance;standing dynamic balance  -LF     Dynamic Sitting Balance independent  -LF     Position, Sitting Balance unsupported  -LF     Dynamic Standing Balance independent  -LF     Position/Device Used, Standing Balance unsupported  -LF               User Key  (r) = Recorded By, (t) = Taken By, (c) = Cosigned By      Initials Name Provider Type     Payton Gabriel, MIGUEL A Occupational Therapist                   Goals/Plan    No documentation.                  Clinical Impression       Row Name 09/21/23 1337          Pain Assessment    Additional Documentation Pain Scale: FACES Pre/Post-Treatment (Group)  -LF       Row Name 09/21/23 1337          Pain Scale: FACES Pre/Post-Treatment    Pain:  FACES Scale, Pretreatment 0-->no hurt  -LF     Posttreatment Pain Rating 0-->no hurt  -LF       Row Name 09/21/23 3027          Plan of Care Review    Plan of Care Reviewed With patient  -LF     Progress no change  -LF     Outcome Evaluation Patient does not present with any significant decline in function and does not require inpatient occupational therapy, therefore they will be discharged from services at this time. She is independent with BADLs, functional transfers, and mobility without a device. It is recommended she discharge home when medically able to do so. Patient is aware and agreeable with treatment plan at this time.  -LF       Row Name 09/21/23 2169          Therapy Assessment/Plan (OT)    Patient/Family Therapy Goal Statement (OT) None reported.  -LF     Rehab Potential (OT) other (see comments)  -     Criteria for Skilled Therapeutic Interventions Met (OT) other (see comments)  N/A  -LF     Therapy Frequency (OT) evaluation only  -       Row Name 09/21/23 1175          Therapy Plan Review/Discharge Plan (OT)    Anticipated Discharge Disposition (OT) home  -       Row Name 09/21/23 2830          Vital Signs    O2 Delivery Pre Treatment room air  -LF     O2 Delivery Intra Treatment room air  -LF     O2 Delivery Post Treatment room air  -LF               User Key  (r) = Recorded By, (t) = Taken By, (c) = Cosigned By      Initials Name Provider Type     Payton Gabriel, OT Occupational Therapist                   Outcome Measures       Row Name 09/21/23 1143          How much help from another is currently needed...    Putting on and taking off regular lower body clothing? 4  -LF     Bathing (including washing, rinsing, and drying) 4  -LF     Toileting (which includes using toilet bed pan or urinal) 4  -LF     Putting on and taking off regular upper body clothing 4  -LF     Taking care of personal grooming (such as brushing teeth) 4  -LF     Eating meals 4  -LF     AM-PAC 6 Clicks Score (OT) 24   -LF       Row Name 09/21/23 1340          Functional Assessment    Outcome Measure Options AM-PAC 6 Clicks Daily Activity (OT);Optimal Instrument  -LF       Row Name 09/21/23 1340          Optimal Instrument    Optimal Instrument Optimal - 3  -LF     Bending/Stooping 1  -LF     Standing 1  -LF     Reaching 1  -LF     From the list, choose the 3 activities you would most like to be able to do without any difficulty Bending/stooping;Standing;Reaching  -LF     Total Score Optimal - 3 3  -LF               User Key  (r) = Recorded By, (t) = Taken By, (c) = Cosigned By      Initials Name Provider Type     Payton Gabriel OT Occupational Therapist                    Occupational Therapy Education       Title: PT OT SLP Therapies (In Progress)       Topic: Occupational Therapy (In Progress)       Point: ADL training (In Progress)       Description:   Instruct learner(s) on proper safety adaptation and remediation techniques during self care or transfers.   Instruct in proper use of assistive devices.                  Learning Progress Summary             Patient Acceptance, E,TB, NR by  at 9/21/2023 1341                         Point: Precautions (In Progress)       Description:   Instruct learner(s) on prescribed precautions during self-care and functional transfers.                  Learning Progress Summary             Patient Acceptance, E,TB, NR by  at 9/21/2023 1341                         Point: Body mechanics (In Progress)       Description:   Instruct learner(s) on proper positioning and spine alignment during self-care, functional mobility activities and/or exercises.                  Learning Progress Summary             Patient Acceptance, E,TB, NR by  at 9/21/2023 1341                                         User Key       Initials Effective Dates Name Provider Type Discipline     06/16/21 -  Payton Gabriel OT Occupational Therapist OT                  OT Recommendation and Plan  Therapy  Frequency (OT): evaluation only  Plan of Care Review  Plan of Care Reviewed With: patient  Progress: no change  Outcome Evaluation: Patient does not present with any significant decline in function and does not require inpatient occupational therapy, therefore they will be discharged from services at this time. She is independent with BADLs, functional transfers, and mobility without a device. It is recommended she discharge home when medically able to do so. Patient is aware and agreeable with treatment plan at this time.     Time Calculation:   Evaluation Complexity (OT)  Review Occupational Profile/Medical/Therapy History Complexity: brief/low complexity  Assessment, Occupational Performance/Identification of Deficit Complexity: 1-3 performance deficits  Clinical Decision Making Complexity (OT): problem focused assessment/low complexity  Overall Complexity of Evaluation (OT): low complexity     Time Calculation- OT       Row Name 09/21/23 1341             Time Calculation- OT    OT Received On 09/21/23  -LF         Untimed Charges    OT Eval/Re-eval Minutes 25  -LF         Total Minutes    Untimed Charges Total Minutes 25  -LF       Total Minutes 25  -LF                User Key  (r) = Recorded By, (t) = Taken By, (c) = Cosigned By      Initials Name Provider Type    LF Payton Gabriel OT Occupational Therapist                  Therapy Charges for Today       Code Description Service Date Service Provider Modifiers Qty    [id number] HC OT EVAL LOW COMPLEXITY 2 9/21/2023 Payton Gabriel OT GO 1                 Payton Gabriel OT  9/21/2023

## 2023-09-21 NOTE — CONSULTS
Consulted to place a midline for frequent phlebotomy and need for continuous insulin infusion and IV fluids.    Arrived in patient's room. Explained who I was and my purpose in coming to see patient. Patient states that she doesn't want a midline.     Conclusion: No attempt to place a midline per patient request.    Please reconsult our services if patient needs and agrees to a midline.    Raad Adan RN

## 2023-09-21 NOTE — PLAN OF CARE
Goal Outcome Evaluation:  Plan of Care Reviewed With: patient        Progress: no change  Outcome Evaluation: Patient does not present with any significant decline in function and does not require inpatient occupational therapy, therefore they will be discharged from services at this time. She is independent with BADLs, functional transfers, and mobility without a device. It is recommended she discharge home when medically able to do so. Patient is aware and agreeable with treatment plan at this time.      Anticipated Discharge Disposition (OT): home

## 2023-09-21 NOTE — PLAN OF CARE
Goal Outcome Evaluation:  Plan of Care Reviewed With: patient        Progress: no change  Outcome Evaluation: Patient not appropriate for skilled PT services as she has not had a decline in functional mobility. Patient is currently independent with all transfers and ambulation. She is safe to return home once medically able. D/C PT order at this time with patient in agreement.      Anticipated Discharge Disposition (PT): home

## 2023-09-21 NOTE — CONSULTS
"Nutrition Services    Patient Name: Jose Chacko  YOB: 1990  MRN: [id number]  Admission date: 9/20/2023      CLINICAL NUTRITION ASSESSMENT      Reason for Assessment  Nurse practioner/physician assistant consult, Need for education   H&P:    Past Medical History:   Diagnosis Date    Anxiety     Depression     anxiety    PTSD (post-traumatic stress disorder)         Current Problems:   Active Hospital Problems    Diagnosis     **DKA (diabetic ketoacidosis)         Nutrition/Diet History         Narrative     RD consulted to provide MNT for patient newly diagnosed with diabetes. Pt admitted to ICU in DKA. RD visited patient in unit, not in room, transferred to med/surg floor.     RD able to visit patient on 4NT. Provided both verbal and written materials regarding MNT for consistent carbohydrate diet.       Anthropometrics        Current Height, Weight Height: 170.2 cm (67\")  Weight: 115 kg (253 lb 8.5 oz)   Current BMI Body mass index is 39.71 kg/m².       Weight Hx  Wt Readings from Last 30 Encounters:   09/20/23 1017 115 kg (253 lb 8.5 oz)   03/08/23 1635 114 kg (251 lb)   11/18/22 1323 102 kg (225 lb)   07/13/22 1913 114 kg (251 lb 9.6 oz)   12/28/21 1937 109 kg (240 lb)            Wt Change Observation stable     Estimated/Assessed Needs       Energy Requirements 25-30 kcal/kg adj BW (74.8 kg)   EST Needs (kcal/day) 5568-6626 kcal       Protein Requirements 0.8-1.0 g/kg   EST Daily Needs (g/day) 60-75 g       Fluid Requirements 25 ml/kg    Estimated Needs (mL/day) 1540 ml     Labs/Medications         Pertinent Labs Reviewed.   Results from last 7 days   Lab Units 09/21/23  0415 09/21/23  0022 09/20/23  1929 09/20/23  1451 09/20/23  1039   SODIUM mmol/L 139 137 135*   < > 123*   POTASSIUM mmol/L 3.9 3.7 3.4*   < > 4.8   CHLORIDE mmol/L 106 106 100   < > 83*   CO2 mmol/L 26.8 23.5 26.4   < > 25.6   BUN mg/dL 10 12 12   < > 15   CREATININE mg/dL 0.52* 0.56* 0.66   < > 0.99   CALCIUM mg/dL 8.3* " 8.4* 8.6   < > 9.3   BILIRUBIN mg/dL  --   --   --   --  0.3   ALK PHOS U/L  --   --   --   --  187*   ALT (SGPT) U/L  --   --   --   --  43*   AST (SGOT) U/L  --   --   --   --  48*   GLUCOSE mg/dL 103* 117* 235*   < > 850*    < > = values in this interval not displayed.     Results from last 7 days   Lab Units 09/21/23  0416 09/21/23  0415 09/21/23  0022 09/20/23  1929   MAGNESIUM mg/dL  --  1.9 1.8 1.8   PHOSPHORUS mg/dL  --  2.9 3.1 2.8   HEMOGLOBIN g/dL 10.3*  --   --   --    HEMATOCRIT % 32.6*  --   --   --      No results found for: COVID19  Lab Results   Component Value Date    HGBA1C 9.20 (H) 09/20/2023         Pertinent Medications Reviewed.     Current Nutrition Orders & Evaluation of Intake       Oral Nutrition     Current PO Diet Diet: Diabetic Diets; Consistent Carbohydrate; Texture: Regular Texture (IDDSI 7); Fluid Consistency: Thin (IDDSI 0)   Supplement No active supplement orders       Malnutrition Severity Assessment                Nutrition Diagnosis         Nutrition Dx Problem 1 Food and nutrition knowledge deficit related to lack of prior nutrition related education as evidenced by  newly diagnosed with diabetes.      Nutrition Intervention         MNT for consistent carbohydrate diet.     Medical Nutrition Therapy/Nutrition Education          Learner     Readiness Patient  Acceptance     Method     Response Explanation and Written Material  Verbalizes understanding     Monitor/Evaluation        Monitor Per protocol, Pertinent labs, POC/GOC     Nutrition Discharge Plan         Follow consistent carbohydrate diet.      Electronically signed by:  Gayle Wu RD  09/21/23 14:30 EDT     No

## 2023-09-21 NOTE — PLAN OF CARE
Goal Outcome Evaluation:     Patient transferred from unit to 4006, no complaints of pain or discomfort, vitals stable.

## 2023-09-21 NOTE — PROGRESS NOTES
Pulmonary / Critical Care Progress Note      Patient Name: Jose Chacko  : 1990  MRN: [id number]  Attending:  Eliza Tran MD   Date of admission: 2023    Subjective   Subjective   Patient critically ill with HHS    Over past 24 hours:  Doing well this morning  Currently on room air  Anion gap closed x3  A1c 9.2  We will transition her to subcu insulin  Tolerating diet already last night    Review of Systems  Constitutional symptoms:  Denied complaints   Ear, nose, throat: Denied complaints  Cardiovascular:  Denied complaints  Respiratory: Denied complaints  Gastrointestinal: Denied complaints  Musculoskeletal: Denied complaints  Neurologic: Denied complaints  Skin: Denied complaints        Objective   Objective     Vitals:   Vital signs for last 24 hours:  Temp:  [97.6 °F (36.4 °C)-98.7 °F (37.1 °C)] 97.6 °F (36.4 °C)  Heart Rate:  [70-83] 74  BP: ()/(64-83) 122/64    Intake/Output last 3 shifts:  I/O last 3 completed shifts:  In: 1419 [P.O.:1070; I.V.:349]  Out: -   Intake/Output this shift:  No intake/output data recorded.    Vent settings for last 24 hours:       Hemodynamic parameters for last 24 hours:       Physical Exam   Vital Signs Reviewed   General:  Alert, NAD.    HEENT:  PERRL, EOMI.  OP  Chest:  Clear to auscultation bilaterally, no work of breathing noted on room air  CV: RRR, no MGR, pulses 2+, equal.  Abd:  Soft, NT, ND, + BS, obese  EXT:  no clubbing, no cyanosis, no edema  Neuro:  A&Ox3, CN grossly intact, no focal deficits.  Skin: No rashes or lesions noted      Result Review    Result Review:  I have personally reviewed the results from the time of this admission to 2023 12:59 EDT and agree with these findings:  []  Laboratory  []  Microbiology  []  Radiology  []  EKG/Telemetry   []  Cardiology/Vascular   []  Pathology  []  Old records  []  Other:  Most notable findings include:   -    Assessment & Plan   Assessment / Plan     Active Hospital  Problems:  Active Hospital Problems    Diagnosis     **DKA (diabetic ketoacidosis)          Impression:  [facility]  Cocaine, positive on admission  Diabetes, A1c 9.2  History of ADHD on Adderall  History of PTSD/anxiety     Plan:  -Currently on room air  -Pressors: None needed at this time  -Continue home Abilify, viibryd  -Currently on insulin drip per [facility] protocol --> will transition to levemir 8U QHS + SSI  -Cont aspiration precautions. Keep HOB 30 deg.   -Replace electrolytes PRN to keep K 4.0, Mag 2.0, Phos 4.0.  -Keep glucose 140-180 while in ICU. Cont SSI.  -Transfuse to keep Hgb >7  -Consult case management/social work for substance abuse  -Of note patient takes Adderall, Klonopin, gabapentin, Suboxone at home, will restart these medications today  -DVT ppx: Lovenox  -GI ppx: None  -Lines: PIVs      DVT prophylaxis:  Medical DVT prophylaxis orders are present.    CODE STATUS:   Level Of Support Discussed With: Patient  Code Status (Patient has no pulse and is not breathing): CPR (Attempt to Resuscitate)  Medical Interventions (Patient has pulse or is breathing): Full Support    Patient is critically ill in the ICU with [facility] on insulin drip. 31 minutes of critical care time was spent managing this patient, excluding procedures. This included personally reviewing all pertinent labs, imaging, microbiology and documentation. Also discussing the case with the patient and any available family, the admitting physician and any available ancillary staff.     Electronically signed by Alcides Martinez MD, 09/21/23, 12:59 PM EDT.

## 2023-09-22 ENCOUNTER — READMISSION MANAGEMENT (OUTPATIENT)
Dept: CALL CENTER | Facility: HOSPITAL | Age: 33
End: 2023-09-22
Payer: COMMERCIAL

## 2023-09-22 VITALS
SYSTOLIC BLOOD PRESSURE: 135 MMHG | HEART RATE: 80 BPM | RESPIRATION RATE: 18 BRPM | DIASTOLIC BLOOD PRESSURE: 89 MMHG | OXYGEN SATURATION: 97 % | HEIGHT: 67 IN | WEIGHT: 258.6 LBS | BODY MASS INDEX: 40.59 KG/M2 | TEMPERATURE: 97.9 F

## 2023-09-22 PROBLEM — F19.94 DRUG-INDUCED MOOD DISORDER: Status: ACTIVE | Noted: 2021-01-01

## 2023-09-22 PROBLEM — F19.10 POLYSUBSTANCE ABUSE: Status: ACTIVE | Noted: 2020-12-24

## 2023-09-22 PROBLEM — E11.10 DKA (DIABETIC KETOACIDOSIS): Status: RESOLVED | Noted: 2023-09-20 | Resolved: 2023-09-22

## 2023-09-22 LAB
ALBUMIN SERPL-MCNC: 3.1 G/DL (ref 3.5–5.2)
ALP SERPL-CCNC: 143 U/L (ref 39–117)
ALT SERPL W P-5'-P-CCNC: 50 U/L (ref 1–33)
ANION GAP SERPL CALCULATED.3IONS-SCNC: 6.3 MMOL/L (ref 5–15)
AST SERPL-CCNC: 80 U/L (ref 1–32)
BASOPHILS # BLD AUTO: 0.02 10*3/MM3 (ref 0–0.2)
BASOPHILS NFR BLD AUTO: 0.5 % (ref 0–1.5)
BILIRUB CONJ SERPL-MCNC: <0.2 MG/DL (ref 0–0.3)
BILIRUB INDIRECT SERPL-MCNC: ABNORMAL MG/DL
BILIRUB SERPL-MCNC: 0.2 MG/DL (ref 0–1.2)
BUN SERPL-MCNC: 10 MG/DL (ref 6–20)
BUN/CREAT SERPL: 14.1 (ref 7–25)
CALCIUM SPEC-SCNC: 8.6 MG/DL (ref 8.6–10.5)
CHLORIDE SERPL-SCNC: 103 MMOL/L (ref 98–107)
CO2 SERPL-SCNC: 26.7 MMOL/L (ref 22–29)
CREAT SERPL-MCNC: 0.71 MG/DL (ref 0.57–1)
DEPRECATED RDW RBC AUTO: 45.7 FL (ref 37–54)
EGFRCR SERPLBLD CKD-EPI 2021: 114.6 ML/MIN/1.73
EOSINOPHIL # BLD AUTO: 0.18 10*3/MM3 (ref 0–0.4)
EOSINOPHIL NFR BLD AUTO: 4.5 % (ref 0.3–6.2)
ERYTHROCYTE [DISTWIDTH] IN BLOOD BY AUTOMATED COUNT: 15.2 % (ref 12.3–15.4)
GLUCOSE BLDC GLUCOMTR-MCNC: 263 MG/DL (ref 70–99)
GLUCOSE BLDC GLUCOMTR-MCNC: 330 MG/DL (ref 70–99)
GLUCOSE SERPL-MCNC: 335 MG/DL (ref 65–99)
HCT VFR BLD AUTO: 34.1 % (ref 34–46.6)
HGB BLD-MCNC: 10.5 G/DL (ref 12–15.9)
IMM GRANULOCYTES # BLD AUTO: 0.02 10*3/MM3 (ref 0–0.05)
IMM GRANULOCYTES NFR BLD AUTO: 0.5 % (ref 0–0.5)
LYMPHOCYTES # BLD AUTO: 1.85 10*3/MM3 (ref 0.7–3.1)
LYMPHOCYTES NFR BLD AUTO: 45.8 % (ref 19.6–45.3)
MAGNESIUM SERPL-MCNC: 1.9 MG/DL (ref 1.6–2.6)
MCH RBC QN AUTO: 25.7 PG (ref 26.6–33)
MCHC RBC AUTO-ENTMCNC: 30.8 G/DL (ref 31.5–35.7)
MCV RBC AUTO: 83.4 FL (ref 79–97)
MONOCYTES # BLD AUTO: 0.4 10*3/MM3 (ref 0.1–0.9)
MONOCYTES NFR BLD AUTO: 9.9 % (ref 5–12)
NEUTROPHILS NFR BLD AUTO: 1.57 10*3/MM3 (ref 1.7–7)
NEUTROPHILS NFR BLD AUTO: 38.8 % (ref 42.7–76)
NRBC BLD AUTO-RTO: 0 /100 WBC (ref 0–0.2)
PHOSPHATE SERPL-MCNC: 2.7 MG/DL (ref 2.5–4.5)
PLATELET # BLD AUTO: 185 10*3/MM3 (ref 140–450)
PMV BLD AUTO: 12.5 FL (ref 6–12)
POTASSIUM SERPL-SCNC: 4.2 MMOL/L (ref 3.5–5.2)
PROT SERPL-MCNC: 6.6 G/DL (ref 6–8.5)
RBC # BLD AUTO: 4.09 10*6/MM3 (ref 3.77–5.28)
SODIUM SERPL-SCNC: 136 MMOL/L (ref 136–145)
WBC NRBC COR # BLD: 4.04 10*3/MM3 (ref 3.4–10.8)

## 2023-09-22 PROCEDURE — 83735 ASSAY OF MAGNESIUM: CPT | Performed by: FAMILY MEDICINE

## 2023-09-22 PROCEDURE — 82948 REAGENT STRIP/BLOOD GLUCOSE: CPT

## 2023-09-22 PROCEDURE — 85025 COMPLETE CBC W/AUTO DIFF WBC: CPT | Performed by: FAMILY MEDICINE

## 2023-09-22 PROCEDURE — 84100 ASSAY OF PHOSPHORUS: CPT | Performed by: FAMILY MEDICINE

## 2023-09-22 PROCEDURE — 63710000001 INSULIN LISPRO (HUMAN) PER 5 UNITS: Performed by: STUDENT IN AN ORGANIZED HEALTH CARE EDUCATION/TRAINING PROGRAM

## 2023-09-22 PROCEDURE — 80076 HEPATIC FUNCTION PANEL: CPT | Performed by: FAMILY MEDICINE

## 2023-09-22 PROCEDURE — 80048 BASIC METABOLIC PNL TOTAL CA: CPT | Performed by: FAMILY MEDICINE

## 2023-09-22 RX ORDER — LANCETS 28 GAUGE
1 EACH MISCELLANEOUS AS NEEDED
Qty: 100 EACH | Refills: 12 | Status: SHIPPED | OUTPATIENT
Start: 2023-09-22

## 2023-09-22 RX ORDER — BLOOD SUGAR DIAGNOSTIC
1 STRIP MISCELLANEOUS DAILY
Qty: 100 EACH | Refills: 0 | Status: SHIPPED | OUTPATIENT
Start: 2023-09-22

## 2023-09-22 RX ORDER — BLOOD-GLUCOSE METER
1 KIT MISCELLANEOUS DAILY
Qty: 1 EACH | Refills: 0 | Status: SHIPPED | OUTPATIENT
Start: 2023-09-22

## 2023-09-22 RX ORDER — GLIPIZIDE 10 MG/1
10 TABLET ORAL
Status: DISCONTINUED | OUTPATIENT
Start: 2023-09-22 | End: 2023-09-22 | Stop reason: HOSPADM

## 2023-09-22 RX ORDER — BLOOD-GLUCOSE METER
1 KIT MISCELLANEOUS AS NEEDED
Qty: 100 EACH | Refills: 3 | Status: SHIPPED | OUTPATIENT
Start: 2023-09-22

## 2023-09-22 RX ORDER — GLIPIZIDE 10 MG/1
10 TABLET ORAL
Qty: 60 TABLET | Refills: 0 | Status: SHIPPED | OUTPATIENT
Start: 2023-09-22 | End: 2023-10-22

## 2023-09-22 RX ADMIN — BUPRENORPHINE HYDROCHLORIDE AND NALOXONE HYDROCHLORIDE DIHYDRATE 2 TABLET: 8; 2 TABLET SUBLINGUAL at 08:44

## 2023-09-22 RX ADMIN — ARIPIPRAZOLE 10 MG: 10 TABLET ORAL at 08:44

## 2023-09-22 RX ADMIN — Medication 10 ML: at 08:46

## 2023-09-22 RX ADMIN — GLIPIZIDE 10 MG: 5 TABLET ORAL at 08:44

## 2023-09-22 RX ADMIN — GABAPENTIN 600 MG: 300 CAPSULE ORAL at 05:13

## 2023-09-22 RX ADMIN — INSULIN LISPRO 4 UNITS: 100 INJECTION, SOLUTION INTRAVENOUS; SUBCUTANEOUS at 12:27

## 2023-09-22 RX ADMIN — Medication 10 ML: at 08:45

## 2023-09-22 RX ADMIN — ACETAMINOPHEN 1000 MG: 500 TABLET ORAL at 08:44

## 2023-09-22 RX ADMIN — INSULIN LISPRO 5 UNITS: 100 INJECTION, SOLUTION INTRAVENOUS; SUBCUTANEOUS at 08:45

## 2023-09-22 RX ADMIN — VILAZODONE HYDROCHLORIDE 40 MG: 20 TABLET ORAL at 08:44

## 2023-09-22 RX ADMIN — CLONAZEPAM 1 MG: 0.5 TABLET ORAL at 05:14

## 2023-09-22 NOTE — CONSULTS
Provided patient with written material, Diabetes Survival Skills, and covered the pertinent material in depth. Patient states she has a significant family history of diabetes. Discussed the genetic component of T2DM. Provided extensive education regarding diet, the importance of taking medication with food to prevent hypoglycemia, and the avoidance of beverages with high concentrations of carbohydrates.     Provided education on medications to be discharged on, metformin and glipizide. Discussed the mechanism of action and possible side effects. Patient states she intends to discuss Ozempic with her PCP.    Provided patient with contact information for [facility]. Patient has a follow-up appointment with her PCP on 9/26/2023 at 1300, and patient provided with an appointment reminder card.

## 2023-09-22 NOTE — DISCHARGE INSTR - APPOINTMENTS
PCP follow up- Zachery Berrios, APRN  September 26, 2023 1:00pm.    [address]  [place] [address]  [phone]

## 2023-09-22 NOTE — SIGNIFICANT NOTE
09/22/23 1115   Coping/Psychosocial   Observed Emotional State calm;cooperative   Verbalized Emotional State relief;hopefulness   Trust Relationship/Rapport empathic listening provided   Involvement in Care interacting with patient   Additional Documentation Spiritual Care (Group)   Spiritual Care   Use of Spiritual Resources non-Faith use of spiritual care   Spiritual Care Source  initiative   Spiritual Care Follow-Up follow-up, none required as presently assessed   Response to Spiritual Care receptive of support   Spiritual Care Interventions supportive conversation provided   Spiritual Care Visit Type initial   Receptivity to Spiritual Care visit welcomed

## 2023-09-22 NOTE — DISCHARGE SUMMARY
[facility]         HOSPITALIST  DISCHARGE SUMMARY    Patient Name: Jose Chacko  : 1990  MRN: [id number]    Date of Admission: 2023  Date of Discharge:  2023    Primary Care Physician: Zachery Berrios APRN    Consults       Date and Time Order Name Status Description    2023 12:18 PM Hospitalist (on-call MD unless specified)              Active and Resolved Hospital Problems:  Active Hospital Problems   No active problems to display.      Resolved Hospital Problems    Diagnosis POA    DKA (diabetic ketoacidosis) [E11.10] Yes     HHS  New diagnosis diabetes mellitus A1c 9.2  ADHD  PTSD  Anxiety  Depression  Ex-smoker  Current drug user relapse with cocaine  Long-term Suboxone therapy      Hospital Course     Hospital Course:  33-year-old female history of anxiety, PTSD, ADHD, depression, ex-smoker, presented with chief complaint of hypoglycemia, blood sugars found to be greater than 500, with significant ANDRIA, with hyperosmolar hyperglycemia state, placed in the critical care unit on continuous infusion of insulin,, with positive urinary toxicology for cocaine, with admitted relapse, takes Suboxone at home, transitioning from continuous insulin to basal bolus. A1c 9.2. Can try off insulin with orals at time of disposition.  Discussed starting metformin and glipizide and high-dose form.  Blood sugars were controlled.  Discharged in hemodynamically stable condition with a prescription for a home glucose monitor on 2023, to avoid foods with high sugar content, to follow-up with diabetes clinic within 1 to 2 weeks, strongly encourage patient to follow-up with PCP within 1 week.  Counseled patient to seek help regarding her drug addictions.    Day of Discharge     Vital Signs:  Temp:  [97.3 °F (36.3 °C)-98.5 °F (36.9 °C)] 97.9 °F (36.6 °C)  Heart Rate:  [67-83] 80  Resp:  [16-20] 18  BP: (102-135)/(62-89) 135/89    Review of systems:  All systems reviewed and  negative except generalized fatigue, generalized weakness    Physical Exam                         Constitutional: Awake, alert, no acute distress              Eyes: Pupils equal, sclerae anicteric, no conjunctival injection              HENT: NCAT, mucous membranes moist, poor dentition              Neck: Supple, no thyromegaly, no lymphadenopathy, trachea midline              Respiratory: Clear to auscultation bilaterally, nonlabored respirations               Cardiovascular: RRR, no murmurs, rubs, or gallops, palpable pedal pulses bilaterally              Gastrointestinal: Positive bowel sounds, soft, nontender, nondistended              Musculoskeletal: No bilateral ankle edema, no clubbing or cyanosis to extremities              Psychiatric: Appropriate affect, cooperative              Neurologic: Oriented x 3, strength symmetric in all extremities, Cranial Nerves grossly intact to confrontation, speech clear              Skin: No rashes visible on exposed skin         Discharge Details        Discharge Medications        New Medications        Instructions Start Date   Alcohol Pads 70 % pads   1 swab , Does not apply, Daily      freestyle lancets   1 each, Other, As Needed, Use as instructed      FreeStyle Lite device   1 Device, Does not apply, Daily      FREESTYLE LITE test strip  Generic drug: glucose blood   1 each, Other, As Needed, Use as instructed      glipizide 10 MG tablet  Commonly known as: GLUCOTROL   10 mg, Oral, 2 Times Daily Before Meals      metFORMIN 1000 MG tablet  Commonly known as: GLUCOPHAGE   1,000 mg, Oral, 2 Times Daily With Meals             Continue These Medications        Instructions Start Date   amphetamine-dextroamphetamine 30 MG tablet  Commonly known as: ADDERALL   30 mg, Oral, 2 Times Daily      ARIPiprazole 10 MG tablet  Commonly known as: ABILIFY   10 mg, Oral, Daily      buprenorphine-naloxone 8-2 MG film film  Commonly known as: SUBOXONE   2 films, Sublingual, Daily       clonazePAM 1 MG tablet  Commonly known as: KlonoPIN   1 mg, Oral, 3 Times Daily PRN      diclofenac 50 MG EC tablet  Commonly known as: VOLTAREN   1 tablet, Oral, Every 12 Hours Scheduled      gabapentin 600 MG tablet  Commonly known as: NEURONTIN   600 mg, Oral, 3 Times Daily      prazosin 2 MG capsule  Commonly known as: MINIPRESS   2 mg, Oral, Every Night at Bedtime      vilazodone 40 MG tablet tablet  Commonly known as: VIIBRYD   1 tablet, Oral, Daily               No Known Allergies    Discharge Disposition:  Home or Self Care    Diet:  Hospital:  Diet Order   Procedures    Diet: Diabetic Diets; Consistent Carbohydrate; Texture: Regular Texture (IDDSI 7); Fluid Consistency: Thin (IDDSI 0)       Discharge Activity:       CODE STATUS:  Code Status and Medical Interventions:   Ordered at: 09/20/23 1317     Level Of Support Discussed With:    Patient     Code Status (Patient has no pulse and is not breathing):    CPR (Attempt to Resuscitate)     Medical Interventions (Patient has pulse or is breathing):    Full Support         Future Appointments   Date Time Provider Department Center   10/31/2023  3:00 PM Tiarra Merino APRN [facility]       Additional Instructions for the Follow-ups that You Need to Schedule       Ambulatory Referral to [facility]   As directed      Diagnosis (search or select): Type 2 diabetes mellitus with hyperglycemia [954617]   Services Requested: Provider Consultation   Provider orders: Medication/insulin start and management Continuous Glucose Monitoring evaluation        Discharge Follow-up with PCP   As directed       Currently Documented PCP:    Provider, No Known    PCP Phone Number:    None     Follow Up Details: 3 to 7 days                Pertinent  and/or Most Recent Results     PROCEDURES:   No results found.      LAB RESULTS:      Lab 09/22/23  0536 09/21/23  0416 09/20/23  1132   WBC 4.04 5.31 7.45   HEMOGLOBIN 10.5* 10.3* 11.8*   HEMATOCRIT 34.1  32.6* 36.3   PLATELETS 185 174 206   NEUTROS ABS 1.57* 1.90 4.76   IMMATURE GRANS (ABS) 0.02 0.02 0.01   LYMPHS ABS 1.85 2.49 2.05   MONOS ABS 0.40 0.64 0.55   EOS ABS 0.18 0.23 0.05   MCV 83.4 81.5 80.0         Lab 09/22/23  0536 09/21/23  0415 09/21/23  0022 09/20/23  1929 09/20/23  1451 09/20/23  1132   SODIUM 136 139 137 135* 132*  --    POTASSIUM 4.2 3.9 3.7 3.4* 4.1  --    CHLORIDE 103 106 106 100 97*  --    CO2 26.7 26.8 23.5 26.4 23.4  --    ANION GAP 6.3 6.2 7.5 8.6 11.6  --    BUN 10 10 12 12 15  --    CREATININE 0.71 0.52* 0.56* 0.66 0.74  --    EGFR 114.6 125.2 123.0 118.2 109.0  --    GLUCOSE 335* 103* 117* 235* 458*  --    CALCIUM 8.6 8.3* 8.4* 8.6 8.8  --    MAGNESIUM 1.9 1.9 1.8 1.8 1.7  --    PHOSPHORUS 2.7 2.9 3.1 2.8 3.5  --    HEMOGLOBIN A1C  --   --   --   --   --  9.20*         Lab 09/22/23  0536 09/20/23  1039   TOTAL PROTEIN 6.6 7.7   ALBUMIN 3.1* 3.5   GLOBULIN  --  4.2   ALT (SGPT) 50* 43*   AST (SGOT) 80* 48*   BILIRUBIN 0.2 0.3   BILIRUBIN DIRECT <0.2  --    ALK PHOS 143* 187*                 Lab 09/20/23  1451 09/20/23  1132   IRON  --  47   IRON SATURATION (TSAT)  --  10*   TIBC  --  468   TRANSFERRIN  --  314   FERRITIN 119.80  --    FOLATE  --  19.80   VITAMIN B 12  --  1,041*         Brief Urine Lab Results  (Last result in the past 365 days)        Color   Clarity   Blood   Leuk Est   Nitrite   Protein   CREAT   Urine HCG        09/20/23 1115 Yellow   Clear   Moderate (2+)   Negative   Negative   Negative                 Microbiology Results (last 10 days)       ** No results found for the last 240 hours. **            Labs Pending at Discharge: None    Time spent on Discharge including face to face service:  32 minutes    Electronically signed by Eliza Tran MD, 09/22/23, 1:25 PM EDT.    Portions of this documentation were transcribed electronically from a voice recognition software.  I confirm all data accurately represents the service(s) I performed at today's visit.

## 2023-09-22 NOTE — PLAN OF CARE
Goal Outcome Evaluation:      Patient being discharged home, new diabetic education printed, no complaints of pain or discomfort, vitals stable.

## 2023-09-22 NOTE — CONSULTS
Discharge Planning Assessment   [facility]     Patient Name: Jose Chacko  MRN: [id number]  Today's Date: 9/22/2023    Admit Date: 9/20/2023   Discharge Plan       Row Name 09/22/23 0940       Plan    Final Note Pt to discharge home today. PCP follow up with MAYLIN Sage Tuesday, 09/26 @ 1pm. No additional needs noted.      Row Name 09/22/23 0846       Plan    Patient/Family in Agreement with Plan yes      Row Name 09/22/23 0834       Plan    Final Discharge Disposition Code 01 - home or self-care             Expected Discharge Date and Time       Expected Discharge Date Expected Discharge Time    Sep 22, 2023           DAVID Medina

## 2023-09-22 NOTE — OUTREACH NOTE
Prep Survey      Flowsheet Row Responses   [facility] facility patient discharged from? [facility]   Is LACE score < 7 ? Yes   Eligibility Not Eligible   What are the reasons patient is not eligible? Other  [low risk for readmit]   Does the patient have one of the following disease processes/diagnoses(primary or secondary)? Other   Prep survey completed? Yes            Evon GONZALEZ - Registered Nurse

## 2023-09-22 NOTE — CONSULTS
Discharge Planning Assessment   [facility]     Patient Name: Jose Chacko  MRN: [id number]  Today's Date: 9/22/2023    Admit Date: 9/20/2023  Plan: SW met with patient at the bedside to complete assessment. Pt states that she is normally independent at home, and is curerntly employed. Pt lives at home wih parents and will DC back home with them when stable. No needs identified at this time.     Discharge Plan       Row Name 09/22/23 0846       Plan    Patient/Family in Agreement with Plan yes      Row Name 09/22/23 0834       Plan    Final Discharge Disposition Code 01 - home or self-care             Expected Discharge Date and Time       Expected Discharge Date Expected Discharge Time    Sep 22, 2023           DAVID Medina